# Patient Record
Sex: MALE | Race: WHITE | Employment: FULL TIME | URBAN - METROPOLITAN AREA
[De-identification: names, ages, dates, MRNs, and addresses within clinical notes are randomized per-mention and may not be internally consistent; named-entity substitution may affect disease eponyms.]

---

## 2017-05-27 PROCEDURE — 82043 UR ALBUMIN QUANTITATIVE: CPT | Performed by: INTERNAL MEDICINE

## 2017-05-27 PROCEDURE — 82570 ASSAY OF URINE CREATININE: CPT | Performed by: INTERNAL MEDICINE

## 2017-08-12 ENCOUNTER — APPOINTMENT (OUTPATIENT)
Dept: GENERAL RADIOLOGY | Facility: HOSPITAL | Age: 62
End: 2017-08-12
Attending: EMERGENCY MEDICINE
Payer: COMMERCIAL

## 2017-08-12 ENCOUNTER — HOSPITAL ENCOUNTER (EMERGENCY)
Facility: HOSPITAL | Age: 62
Discharge: HOME | End: 2017-08-12
Attending: EMERGENCY MEDICINE
Payer: COMMERCIAL

## 2017-08-12 VITALS
RESPIRATION RATE: 16 BRPM | TEMPERATURE: 98 F | SYSTOLIC BLOOD PRESSURE: 151 MMHG | DIASTOLIC BLOOD PRESSURE: 92 MMHG | HEART RATE: 85 BPM | BODY MASS INDEX: 26.6 KG/M2 | WEIGHT: 190 LBS | HEIGHT: 71 IN | OXYGEN SATURATION: 98 %

## 2017-08-12 DIAGNOSIS — R07.89 MUSCULAR CHEST PAIN: Primary | ICD-10-CM

## 2017-08-12 LAB
ALBUMIN SERPL-MCNC: 3.7 G/DL (ref 3.5–4.8)
ALP LIVER SERPL-CCNC: 130 U/L (ref 45–117)
ALT SERPL-CCNC: 46 U/L (ref 17–63)
AST SERPL-CCNC: 23 U/L (ref 15–41)
BASOPHILS # BLD AUTO: 0.03 X10(3) UL (ref 0–0.1)
BASOPHILS NFR BLD AUTO: 0.2 %
BILIRUB SERPL-MCNC: 0.2 MG/DL (ref 0.1–2)
BUN BLD-MCNC: 21 MG/DL (ref 8–20)
CALCIUM BLD-MCNC: 9.1 MG/DL (ref 8.3–10.3)
CHLORIDE: 105 MMOL/L (ref 101–111)
CO2: 23 MMOL/L (ref 22–32)
CREAT BLD-MCNC: 1.14 MG/DL (ref 0.7–1.3)
EOSINOPHIL # BLD AUTO: 0.1 X10(3) UL (ref 0–0.3)
EOSINOPHIL NFR BLD AUTO: 0.8 %
ERYTHROCYTE [DISTWIDTH] IN BLOOD BY AUTOMATED COUNT: 12.7 % (ref 11.5–16)
GLUCOSE BLD-MCNC: 264 MG/DL (ref 70–99)
HCT VFR BLD AUTO: 44.8 % (ref 37–53)
HGB BLD-MCNC: 15 G/DL (ref 13–17)
IMMATURE GRANULOCYTE COUNT: 0.04 X10(3) UL (ref 0–1)
IMMATURE GRANULOCYTE RATIO %: 0.3 %
LYMPHOCYTES # BLD AUTO: 1.23 X10(3) UL (ref 0.9–4)
LYMPHOCYTES NFR BLD AUTO: 9.5 %
M PROTEIN MFR SERPL ELPH: 7.6 G/DL (ref 6.1–8.3)
MCH RBC QN AUTO: 28.8 PG (ref 27–33.2)
MCHC RBC AUTO-ENTMCNC: 33.5 G/DL (ref 31–37)
MCV RBC AUTO: 86.2 FL (ref 80–99)
MONOCYTES # BLD AUTO: 0.97 X10(3) UL (ref 0.1–0.6)
MONOCYTES NFR BLD AUTO: 7.5 %
NEUTROPHIL ABS PRELIM: 10.53 X10 (3) UL (ref 1.3–6.7)
NEUTROPHILS # BLD AUTO: 10.53 X10(3) UL (ref 1.3–6.7)
NEUTROPHILS NFR BLD AUTO: 81.7 %
PLATELET # BLD AUTO: 239 10(3)UL (ref 150–450)
POTASSIUM SERPL-SCNC: 4 MMOL/L (ref 3.6–5.1)
RBC # BLD AUTO: 5.2 X10(6)UL (ref 4.3–5.7)
RED CELL DISTRIBUTION WIDTH-SD: 39.8 FL (ref 35.1–46.3)
SODIUM SERPL-SCNC: 137 MMOL/L (ref 136–144)
TROPONIN: <0.046 NG/ML (ref ?–0.05)
WBC # BLD AUTO: 12.9 X10(3) UL (ref 4–13)

## 2017-08-12 PROCEDURE — 93005 ELECTROCARDIOGRAM TRACING: CPT

## 2017-08-12 PROCEDURE — 99285 EMERGENCY DEPT VISIT HI MDM: CPT

## 2017-08-12 PROCEDURE — 80053 COMPREHEN METABOLIC PANEL: CPT | Performed by: EMERGENCY MEDICINE

## 2017-08-12 PROCEDURE — 85025 COMPLETE CBC W/AUTO DIFF WBC: CPT | Performed by: EMERGENCY MEDICINE

## 2017-08-12 PROCEDURE — 93010 ELECTROCARDIOGRAM REPORT: CPT

## 2017-08-12 PROCEDURE — 84484 ASSAY OF TROPONIN QUANT: CPT | Performed by: EMERGENCY MEDICINE

## 2017-08-12 PROCEDURE — 96374 THER/PROPH/DIAG INJ IV PUSH: CPT

## 2017-08-12 PROCEDURE — 71010 XR CHEST AP PORTABLE  (CPT=71010): CPT | Performed by: EMERGENCY MEDICINE

## 2017-08-12 RX ORDER — KETOROLAC TROMETHAMINE 30 MG/ML
30 INJECTION, SOLUTION INTRAMUSCULAR; INTRAVENOUS ONCE
Status: COMPLETED | OUTPATIENT
Start: 2017-08-12 | End: 2017-08-12

## 2017-08-12 RX ORDER — CYCLOBENZAPRINE HCL 10 MG
10 TABLET ORAL 3 TIMES DAILY PRN
Qty: 15 TABLET | Refills: 0 | Status: SHIPPED | OUTPATIENT
Start: 2017-08-12 | End: 2017-09-06 | Stop reason: ALTCHOICE

## 2017-08-12 NOTE — ED INITIAL ASSESSMENT (HPI)
Patient reports left arm/shoulder pain yesterday, relieved with OTC meds. Reports this morning around 730 am developed chest pain that feels like its radiating downwards from throat to midsternal area, aching and sometimes pressure.  Denies any shortness of

## 2017-08-12 NOTE — ED NOTES
Report to Richelle Champion RN at this time. Patient updated with results and plan of care. Ready for discharge at this time.

## 2017-08-12 NOTE — ED PROVIDER NOTES
Patient Seen in: [facility] Emergency Department    History   Patient presents with:  Chest Pain Angina (cardiovascular)    Stated Complaint: left arm pain/shoulder pain, chest pain    HPI    58-year-old male presents with chest pain.   Pain began 3 Does not apply route 2 (two) times daily. 4000 mg daily.         Family History   Problem Relation Age of Onset   • Diabetes Father    • Cancer Mother      breast   • Cancer Sister      melanoma   • Melanoma Sister    • Cancer Brother    • Diabetes Brother following:        Result Value    Glucose 264 (*)     BUN 21 (*)     Alkaline Phosphatase 130 (*)     All other components within normal limits   CBC W/ DIFFERENTIAL - Abnormal; Notable for the following:     Neutrophil Absolute Prelim 10.53 (*)     Neutro was initially in the left shoulder and he took Tylenol and then short while later developed pain down the middle of his chest from the neck to the epigastric region. This discomfort is reproducible with some movements. EKG unremarkable.     Workup negativ

## 2017-08-13 LAB
ATRIAL RATE: 87 BPM
P AXIS: 38 DEGREES
P-R INTERVAL: 160 MS
Q-T INTERVAL: 368 MS
QRS DURATION: 94 MS
QTC CALCULATION (BEZET): 442 MS
R AXIS: 2 DEGREES
T AXIS: 41 DEGREES
VENTRICULAR RATE: 87 BPM

## 2017-12-30 ENCOUNTER — HOSPITAL ENCOUNTER (EMERGENCY)
Facility: HOSPITAL | Age: 62
Discharge: HOME | End: 2017-12-30
Attending: STUDENT IN AN ORGANIZED HEALTH CARE EDUCATION/TRAINING PROGRAM
Payer: COMMERCIAL

## 2017-12-30 ENCOUNTER — OFFICE VISIT (OUTPATIENT)
Dept: FAMILY MEDICINE CLINIC | Facility: CLINIC | Age: 62
End: 2017-12-30

## 2017-12-30 VITALS
TEMPERATURE: 98 F | HEIGHT: 71 IN | RESPIRATION RATE: 18 BRPM | DIASTOLIC BLOOD PRESSURE: 70 MMHG | WEIGHT: 204 LBS | HEART RATE: 80 BPM | OXYGEN SATURATION: 100 % | SYSTOLIC BLOOD PRESSURE: 165 MMHG | BODY MASS INDEX: 28.56 KG/M2

## 2017-12-30 VITALS
WEIGHT: 204.38 LBS | RESPIRATION RATE: 20 BRPM | OXYGEN SATURATION: 98 % | HEART RATE: 79 BPM | DIASTOLIC BLOOD PRESSURE: 90 MMHG | BODY MASS INDEX: 28.61 KG/M2 | SYSTOLIC BLOOD PRESSURE: 148 MMHG | HEIGHT: 71 IN | TEMPERATURE: 98 F

## 2017-12-30 DIAGNOSIS — H60.502 ACUTE OTITIS EXTERNA OF LEFT EAR, UNSPECIFIED TYPE: ICD-10-CM

## 2017-12-30 DIAGNOSIS — H65.02 ACUTE SEROUS OTITIS MEDIA OF LEFT EAR, RECURRENCE NOT SPECIFIED: ICD-10-CM

## 2017-12-30 DIAGNOSIS — H92.02 EAR PAIN, LEFT: ICD-10-CM

## 2017-12-30 DIAGNOSIS — H61.23 BILATERAL IMPACTED CERUMEN: Primary | ICD-10-CM

## 2017-12-30 DIAGNOSIS — H60.502 ACUTE OTITIS EXTERNA OF LEFT EAR, UNSPECIFIED TYPE: Primary | ICD-10-CM

## 2017-12-30 PROCEDURE — 99283 EMERGENCY DEPT VISIT LOW MDM: CPT

## 2017-12-30 PROCEDURE — 99203 OFFICE O/P NEW LOW 30 MIN: CPT | Performed by: PHYSICIAN ASSISTANT

## 2017-12-30 RX ORDER — IBUPROFEN 600 MG/1
600 TABLET ORAL EVERY 8 HOURS PRN
Qty: 30 TABLET | Refills: 0 | Status: SHIPPED | OUTPATIENT
Start: 2017-12-30 | End: 2018-01-06

## 2017-12-30 RX ORDER — AMOXICILLIN 875 MG/1
875 TABLET, COATED ORAL 2 TIMES DAILY
Qty: 20 TABLET | Refills: 0 | Status: SHIPPED | OUTPATIENT
Start: 2017-12-30 | End: 2018-01-09

## 2017-12-30 RX ORDER — HYDROCODONE BITARTRATE AND ACETAMINOPHEN 5; 325 MG/1; MG/1
1-2 TABLET ORAL EVERY 4 HOURS PRN
Qty: 20 TABLET | Refills: 0 | Status: SHIPPED | OUTPATIENT
Start: 2017-12-30 | End: 2018-01-06

## 2017-12-30 RX ORDER — CIPROFLOXACIN AND DEXAMETHASONE 3; 1 MG/ML; MG/ML
4 SUSPENSION/ DROPS AURICULAR (OTIC) 2 TIMES DAILY
Qty: 7.5 ML | Refills: 0 | Status: SHIPPED | OUTPATIENT
Start: 2017-12-30 | End: 2018-01-06

## 2017-12-30 NOTE — PATIENT INSTRUCTIONS
1. Ciprodex  2. Canals cleared  3. Follow up with PCP    Earwax Removal/ Otitis externa    The ear canal makes earwax from the canal’s lining.  The ears make wax to lubricate and protect the ear canal. The ear canal is the tube that connects the middle ea · Don’t use cotton swabs in your ears. Cotton swabs may push wax deeper into the ear canal or damage the eardrum.  Use cotton gauze or a wet washcloth  to gently remove wax on the outside of the ear and around the opening to the ear canal.  · Don't use any © 0204-5069 [employer] [address] [facility], [employer] [place]. All rights reserved. This information is not intended as a substitute for professional medical care. Always follow your healthcare professional's instructions.         Externa · If you feel water trapped in your ear, use ear drops right away. You can get these drops over the counter at most drugstores.  They work by removing water from the ear canal.  Follow-up care  Follow up with your health care provider in one week, or as adv

## 2017-12-30 NOTE — PROGRESS NOTES
CHIEF COMPLAINT:   Patient presents with:  Ear Pain: left ear is block x3-4days      HPI:   Makayla Vogt is a 57year old male who presents to clinic today with complaints of left ear clogged for 4 days. The patient denies any pain.     Associated sym Alcohol use: No               Comment: rare       REVIEW OF SYSTEMS:   GENERAL: feels well  SKIN: no unusual skin lesions or rashes  HEENT: See HPI  LUNGS: No cough, shortness of breath, or wheezing.   CARDIOVASCULAR: No chest pain, palpitations benefits of medication discussed. Stressed importance of completing full course of antibiotic. Tylenol/Motrin prn pain. Call or return if s/sx worsen, do not improve in 3 days, or if fever of 100.4 or greater persists for 72 hours.     There are no

## 2017-12-31 NOTE — ED PROVIDER NOTES
Patient Seen in: [facility] Emergency Department    History   Patient presents with:  Ear Problem Pain (neurosensory)    Stated Complaint: left ear pain    HPI    Patient is a 64-year-old male presenting to the emergency department reporting gradual 180.3 cm (5' 11\")   Wt 92.5 kg   SpO2 100%   BMI 28.45 kg/m²         Physical Exam    Constitutional: oriented to person, place, and time, appears well-developed and well-nourished. HENT:   Head: Normocephalic and atraumatic.    Eyes: Pupils are equal, r (primary encounter diagnosis)  Acute serous otitis media of left ear, recurrence not specified  Ear pain, left    Disposition:  Discharge  12/30/2017 10:02 pm    Follow-up:  Franklyn Church MD  [address] [facility] [id number]  114

## 2017-12-31 NOTE — ED INITIAL ASSESSMENT (HPI)
Pt presents to ED with complaint of left ear pain x3 days. Pt reports seen at [facility] today and given abx ear drops. Pt reports worsening pain. Reports one dose of OTC tylenol without relief.  Denies fevers

## 2018-02-17 PROCEDURE — 82043 UR ALBUMIN QUANTITATIVE: CPT | Performed by: INTERNAL MEDICINE

## 2018-02-17 PROCEDURE — 82570 ASSAY OF URINE CREATININE: CPT | Performed by: INTERNAL MEDICINE

## 2018-03-04 ENCOUNTER — HOSPITAL ENCOUNTER (EMERGENCY)
Facility: HOSPITAL | Age: 63
Discharge: HOME | End: 2018-03-04
Attending: EMERGENCY MEDICINE
Payer: COMMERCIAL

## 2018-03-04 VITALS
OXYGEN SATURATION: 96 % | HEART RATE: 70 BPM | DIASTOLIC BLOOD PRESSURE: 86 MMHG | WEIGHT: 200 LBS | BODY MASS INDEX: 28 KG/M2 | HEIGHT: 71 IN | TEMPERATURE: 98 F | SYSTOLIC BLOOD PRESSURE: 141 MMHG | RESPIRATION RATE: 16 BRPM

## 2018-03-04 DIAGNOSIS — H43.812: ICD-10-CM

## 2018-03-04 DIAGNOSIS — H53.9 VISUAL DISTURBANCE: Primary | ICD-10-CM

## 2018-03-04 PROCEDURE — 99283 EMERGENCY DEPT VISIT LOW MDM: CPT

## 2018-03-04 RX ORDER — TETRACAINE HYDROCHLORIDE 5 MG/ML
2 SOLUTION OPHTHALMIC ONCE
Status: COMPLETED | OUTPATIENT
Start: 2018-03-04 | End: 2018-03-04

## 2018-03-05 NOTE — ED PROVIDER NOTES
Patient Seen in: [facility] Emergency Department    History   Patient presents with:   Eye Visual Problem (opthalmic)    Stated Complaint: eye problem    HPI    57-year-old male presents to the emergency department after having a visual disturbance i Right Eye Chart Acuity: 20/25, Corrected  Left Eye Chart Acuity: 20/50, Corrected    Physical Exam   Constitutional: He is oriented to person, place, and time. He appears well-developed and well-nourished. No distress.    HENT:   Head: Normocephalic and [address] [place] [id number] [phone]    Schedule an appointment as soon as possible for a visit          Medications Prescribed:  Current Discharge Medication List

## 2018-03-05 NOTE — ED INITIAL ASSESSMENT (HPI)
Patient arrives with c/o left eye visual disturbance that happened at 7 PM tonight. Patient states his vision in his left eye went blurry and then a large \"floater\" in the shaped of a \"W\" appeared for about 20 minutes.  Vision returned to normal after f

## 2018-07-21 PROCEDURE — 82570 ASSAY OF URINE CREATININE: CPT | Performed by: INTERNAL MEDICINE

## 2018-07-21 PROCEDURE — 82043 UR ALBUMIN QUANTITATIVE: CPT | Performed by: INTERNAL MEDICINE

## 2018-10-01 ENCOUNTER — APPOINTMENT (OUTPATIENT)
Dept: GENERAL RADIOLOGY | Age: 63
End: 2018-10-01
Attending: PHYSICIAN ASSISTANT
Payer: COMMERCIAL

## 2018-10-01 ENCOUNTER — HOSPITAL ENCOUNTER (OUTPATIENT)
Age: 63
Discharge: HOME | End: 2018-10-01
Payer: COMMERCIAL

## 2018-10-01 VITALS
DIASTOLIC BLOOD PRESSURE: 81 MMHG | HEIGHT: 71 IN | SYSTOLIC BLOOD PRESSURE: 154 MMHG | TEMPERATURE: 99 F | BODY MASS INDEX: 28 KG/M2 | OXYGEN SATURATION: 97 % | RESPIRATION RATE: 16 BRPM | HEART RATE: 58 BPM | WEIGHT: 200 LBS

## 2018-10-01 DIAGNOSIS — M54.31 SCIATICA OF RIGHT SIDE: ICD-10-CM

## 2018-10-01 DIAGNOSIS — G89.29 CHRONIC RIGHT SHOULDER PAIN: Primary | ICD-10-CM

## 2018-10-01 DIAGNOSIS — M25.511 CHRONIC RIGHT SHOULDER PAIN: Primary | ICD-10-CM

## 2018-10-01 PROCEDURE — 73030 X-RAY EXAM OF SHOULDER: CPT | Performed by: PHYSICIAN ASSISTANT

## 2018-10-01 PROCEDURE — 99214 OFFICE O/P EST MOD 30 MIN: CPT

## 2018-10-01 PROCEDURE — 96372 THER/PROPH/DIAG INJ SC/IM: CPT

## 2018-10-01 RX ORDER — CYCLOBENZAPRINE HCL 10 MG
10 TABLET ORAL NIGHTLY
Qty: 10 TABLET | Refills: 0 | Status: SHIPPED | OUTPATIENT
Start: 2018-10-01 | End: 2018-10-11

## 2018-10-01 RX ORDER — KETOROLAC TROMETHAMINE 30 MG/ML
30 INJECTION, SOLUTION INTRAMUSCULAR; INTRAVENOUS ONCE
Status: COMPLETED | OUTPATIENT
Start: 2018-10-01 | End: 2018-10-01

## 2018-10-01 RX ORDER — PREDNISONE 20 MG/1
40 TABLET ORAL DAILY
Qty: 8 TABLET | Refills: 0 | Status: SHIPPED | OUTPATIENT
Start: 2018-10-01 | End: 2018-10-05

## 2018-10-01 RX ORDER — DEXAMETHASONE SODIUM PHOSPHATE 4 MG/ML
10 VIAL (ML) INJECTION ONCE
Status: COMPLETED | OUTPATIENT
Start: 2018-10-01 | End: 2018-10-01

## 2018-10-01 RX ORDER — NAPROXEN 500 MG/1
500 TABLET ORAL 2 TIMES DAILY PRN
Qty: 20 TABLET | Refills: 0 | Status: SHIPPED | OUTPATIENT
Start: 2018-10-01 | End: 2018-10-08

## 2018-10-01 NOTE — ED PROVIDER NOTES
Patient Seen in: [address] [facility]    History   Patient presents with:  Shoulder Pain  Leg Pain    Stated Complaint: right side leg and arm pain x 4 weeks     HPI    26-year-old male here with complaint of right shoulder/arm pain in ta systems are as noted in HPI. Constitutional and vital signs reviewed. All other systems reviewed and negative except as noted above.     Physical Exam     ED Triage Vitals [10/01/18 1419]   BP (!) 166/80   Pulse 70   Resp 20   Temp 99 °F (37.2 °C)   T views were obtained. COMPARISON:  None.   INDICATIONS:  right side leg and arm pain x 4 weeks  PATIENT STATED HISTORY: (As transcribed by Technologist)  Patient presents with chronic right shoulder pain for years, that has been worse in the last few months as needed. Qty: 20 tablet Refills: 0    predniSONE 20 MG Oral Tab  Take 2 tablets (40 mg total) by mouth daily for 4 days. Qty: 8 tablet Refills: 0    Cyclobenzaprine HCl 10 MG Oral Tab  Take 1 tablet (10 mg total) by mouth nightly for 10 days.   Qty: 10

## 2018-10-01 NOTE — ED INITIAL ASSESSMENT (HPI)
Complains of right shoulder pain on and off for the last five months but the last month, pain is worse. Also having on and off right leg pain with certain movements. When pain comes on, hard to walk.

## 2018-11-17 ENCOUNTER — APPOINTMENT (OUTPATIENT)
Dept: CT IMAGING | Facility: HOSPITAL | Age: 63
End: 2018-11-17
Attending: EMERGENCY MEDICINE
Payer: COMMERCIAL

## 2018-11-17 ENCOUNTER — HOSPITAL ENCOUNTER (EMERGENCY)
Facility: HOSPITAL | Age: 63
Discharge: HOME | End: 2018-11-17
Attending: EMERGENCY MEDICINE
Payer: COMMERCIAL

## 2018-11-17 VITALS
WEIGHT: 200 LBS | RESPIRATION RATE: 16 BRPM | DIASTOLIC BLOOD PRESSURE: 72 MMHG | SYSTOLIC BLOOD PRESSURE: 135 MMHG | TEMPERATURE: 97 F | OXYGEN SATURATION: 97 % | HEIGHT: 71 IN | HEART RATE: 51 BPM | BODY MASS INDEX: 28 KG/M2

## 2018-11-17 DIAGNOSIS — N20.0 RENAL STONE: Primary | ICD-10-CM

## 2018-11-17 PROCEDURE — 96375 TX/PRO/DX INJ NEW DRUG ADDON: CPT

## 2018-11-17 PROCEDURE — 83690 ASSAY OF LIPASE: CPT | Performed by: EMERGENCY MEDICINE

## 2018-11-17 PROCEDURE — 81001 URINALYSIS AUTO W/SCOPE: CPT | Performed by: EMERGENCY MEDICINE

## 2018-11-17 PROCEDURE — 99284 EMERGENCY DEPT VISIT MOD MDM: CPT

## 2018-11-17 PROCEDURE — 74176 CT ABD & PELVIS W/O CONTRAST: CPT | Performed by: EMERGENCY MEDICINE

## 2018-11-17 PROCEDURE — 96374 THER/PROPH/DIAG INJ IV PUSH: CPT

## 2018-11-17 PROCEDURE — 85025 COMPLETE CBC W/AUTO DIFF WBC: CPT | Performed by: EMERGENCY MEDICINE

## 2018-11-17 PROCEDURE — 80053 COMPREHEN METABOLIC PANEL: CPT | Performed by: EMERGENCY MEDICINE

## 2018-11-17 RX ORDER — MORPHINE SULFATE 4 MG/ML
4 INJECTION, SOLUTION INTRAMUSCULAR; INTRAVENOUS EVERY 30 MIN PRN
Status: DISCONTINUED | OUTPATIENT
Start: 2018-11-17 | End: 2018-11-17

## 2018-11-17 RX ORDER — ONDANSETRON 2 MG/ML
4 INJECTION INTRAMUSCULAR; INTRAVENOUS ONCE
Status: COMPLETED | OUTPATIENT
Start: 2018-11-17 | End: 2018-11-17

## 2018-11-17 RX ORDER — IBUPROFEN 600 MG/1
600 TABLET ORAL EVERY 8 HOURS PRN
Qty: 30 TABLET | Refills: 0 | Status: SHIPPED | OUTPATIENT
Start: 2018-11-17 | End: 2018-12-17

## 2018-11-17 RX ORDER — KETOROLAC TROMETHAMINE 30 MG/ML
30 INJECTION, SOLUTION INTRAMUSCULAR; INTRAVENOUS ONCE
Status: COMPLETED | OUTPATIENT
Start: 2018-11-17 | End: 2018-11-17

## 2018-11-17 RX ORDER — HYDROCODONE BITARTRATE AND ACETAMINOPHEN 5; 325 MG/1; MG/1
1-2 TABLET ORAL EVERY 6 HOURS PRN
Qty: 20 TABLET | Refills: 0 | Status: SHIPPED | OUTPATIENT
Start: 2018-11-17 | End: 2018-11-24

## 2018-11-17 NOTE — ED NOTES
Patient is resting comfortably - awakens easily and denies pain/nausea. Awaiting urine results. Pt is aware he requires a ride home after morphine.

## 2018-11-17 NOTE — ED NOTES
russell approved of pt leaving at 0530/see emar. Pt remains resting comfortably. Awaiting dischg at 0530.

## 2018-11-17 NOTE — ED NOTES
Pt with steady gait to the bathroom; urine spec sent.  Pt reports decrease in left flank pain and denies n/v.

## 2018-11-17 NOTE — ED PROVIDER NOTES
Patient Seen in: [facility] Emergency Department    History   Patient presents with:  Back Pain (musculoskeletal)  Abdomen/Flank Pain (GI/)    Stated Complaint:     HPI    The patient is a 18-year-old male with a history of renal stones, that have deficits. No facial droop or slurred speech. CN II-XII intact. Grossly normal and symmetric motor strength and sensation proximally and distally throughout all 4 extremities. HEENT: No lymphadenopathy. Oropharynx nml.  Mucus membranes moist.   Supple neck individual orders. RAINBOW DRAW BLUE   RAINBOW DRAW LAVENDER   RAINBOW DRAW LIGHT GREEN   RAINBOW DRAW GOLD   CBC W/ DIFFERENTIAL          Blood was obtained and peripheral IV access was established. He was given IV morphine, normal saline and Zofran. Medication List    START taking these medications    ibuprofen 600 MG Oral Tab  Take 1 tablet (600 mg total) by mouth every 8 (eight) hours as needed for Pain.   Qty: 30 tablet Refills: 0    HYDROcodone-acetaminophen 5-325 MG Oral Tab  Take 1-2 tablets by m

## 2018-11-26 ENCOUNTER — HOSPITAL ENCOUNTER (EMERGENCY)
Facility: HOSPITAL | Age: 63
Discharge: HOME | End: 2018-11-26
Attending: EMERGENCY MEDICINE
Payer: COMMERCIAL

## 2018-11-26 ENCOUNTER — APPOINTMENT (OUTPATIENT)
Dept: CT IMAGING | Facility: HOSPITAL | Age: 63
End: 2018-11-26
Attending: EMERGENCY MEDICINE
Payer: COMMERCIAL

## 2018-11-26 ENCOUNTER — APPOINTMENT (OUTPATIENT)
Dept: GENERAL RADIOLOGY | Facility: HOSPITAL | Age: 63
End: 2018-11-26
Attending: EMERGENCY MEDICINE
Payer: COMMERCIAL

## 2018-11-26 VITALS
HEART RATE: 55 BPM | RESPIRATION RATE: 16 BRPM | SYSTOLIC BLOOD PRESSURE: 138 MMHG | BODY MASS INDEX: 28 KG/M2 | WEIGHT: 200 LBS | DIASTOLIC BLOOD PRESSURE: 74 MMHG | OXYGEN SATURATION: 96 % | TEMPERATURE: 98 F | HEIGHT: 71 IN

## 2018-11-26 DIAGNOSIS — N20.0 KIDNEY STONE: Primary | ICD-10-CM

## 2018-11-26 PROCEDURE — 81001 URINALYSIS AUTO W/SCOPE: CPT | Performed by: EMERGENCY MEDICINE

## 2018-11-26 PROCEDURE — 99284 EMERGENCY DEPT VISIT MOD MDM: CPT | Performed by: EMERGENCY MEDICINE

## 2018-11-26 PROCEDURE — 85025 COMPLETE CBC W/AUTO DIFF WBC: CPT | Performed by: EMERGENCY MEDICINE

## 2018-11-26 PROCEDURE — 74176 CT ABD & PELVIS W/O CONTRAST: CPT | Performed by: EMERGENCY MEDICINE

## 2018-11-26 PROCEDURE — 96375 TX/PRO/DX INJ NEW DRUG ADDON: CPT | Performed by: EMERGENCY MEDICINE

## 2018-11-26 PROCEDURE — 74018 RADEX ABDOMEN 1 VIEW: CPT | Performed by: EMERGENCY MEDICINE

## 2018-11-26 PROCEDURE — 96361 HYDRATE IV INFUSION ADD-ON: CPT | Performed by: EMERGENCY MEDICINE

## 2018-11-26 PROCEDURE — 80053 COMPREHEN METABOLIC PANEL: CPT | Performed by: EMERGENCY MEDICINE

## 2018-11-26 PROCEDURE — 96374 THER/PROPH/DIAG INJ IV PUSH: CPT | Performed by: EMERGENCY MEDICINE

## 2018-11-26 RX ORDER — KETOROLAC TROMETHAMINE 30 MG/ML
30 INJECTION, SOLUTION INTRAMUSCULAR; INTRAVENOUS ONCE
Status: COMPLETED | OUTPATIENT
Start: 2018-11-26 | End: 2018-11-26

## 2018-11-26 RX ORDER — MORPHINE SULFATE 4 MG/ML
4 INJECTION, SOLUTION INTRAMUSCULAR; INTRAVENOUS EVERY 30 MIN PRN
Status: DISCONTINUED | OUTPATIENT
Start: 2018-11-26 | End: 2018-11-26

## 2018-11-26 RX ORDER — ONDANSETRON 2 MG/ML
4 INJECTION INTRAMUSCULAR; INTRAVENOUS ONCE
Status: COMPLETED | OUTPATIENT
Start: 2018-11-26 | End: 2018-11-26

## 2018-11-26 NOTE — ED PROVIDER NOTES
Patient Seen in: [facility] Emergency Department    History   Patient presents with:  Abdomen/Flank Pain (GI/)  Back Pain (musculoskeletal)    Stated Complaint: Flank/back/abdominal pain, was seen last week for 3mm kidney stone L side    HPI    65 (36.8 °C)   Temp src Temporal   SpO2 95 %   O2 Device None (Room air)       Current:/74   Pulse 55   Temp 98.2 °F (36.8 °C) (Temporal)   Resp 16   Ht 180.3 cm (5' 11\")   Wt 90.7 kg   SpO2 96%   BMI 27.89 kg/m²         Physical Exam   Constitutional: WITH PLATELET.   Procedure                               Abnormality         Status                     ---------                               -----------         ------                     CBC W/ DIFFERENTIAL[[id number]]          Abnormal            Final in this area including structure/UPJ obstruction. 2. Cholelithiasis. Dictated by: Santos Bryson MD on 11/17/2018 at 8:13     Approved by: Santos Bryson MD              MDM   Patient had an IV established and blood work obtained.   He was given fl

## 2018-11-26 NOTE — ED NOTES
States no change in pain after receiving Toradol. Continues to be 7-8/10. Administered morphine. Will continue to monitor.

## 2018-11-26 NOTE — ED INITIAL ASSESSMENT (HPI)
Pt was in the ER last week with difficulty passing a kidney stones. Presents this morning with increased pain in his left flank. Denies dysuria or other urinary s/s.

## 2018-12-03 PROCEDURE — 82365 CALCULUS SPECTROSCOPY: CPT | Performed by: UROLOGY

## 2019-01-03 ENCOUNTER — HOSPITAL ENCOUNTER (OUTPATIENT)
Age: 64
Discharge: HOME | End: 2019-01-03
Payer: COMMERCIAL

## 2019-01-03 VITALS
TEMPERATURE: 98 F | DIASTOLIC BLOOD PRESSURE: 76 MMHG | HEIGHT: 71 IN | SYSTOLIC BLOOD PRESSURE: 151 MMHG | WEIGHT: 187 LBS | RESPIRATION RATE: 20 BRPM | OXYGEN SATURATION: 97 % | BODY MASS INDEX: 26.18 KG/M2 | HEART RATE: 89 BPM

## 2019-01-03 DIAGNOSIS — J01.00 ACUTE NON-RECURRENT MAXILLARY SINUSITIS: Primary | ICD-10-CM

## 2019-01-03 PROCEDURE — 99214 OFFICE O/P EST MOD 30 MIN: CPT

## 2019-01-03 PROCEDURE — 99213 OFFICE O/P EST LOW 20 MIN: CPT

## 2019-01-03 RX ORDER — AMOXICILLIN AND CLAVULANATE POTASSIUM 875; 125 MG/1; MG/1
1 TABLET, FILM COATED ORAL 2 TIMES DAILY
Qty: 20 TABLET | Refills: 0 | Status: SHIPPED | OUTPATIENT
Start: 2019-01-03 | End: 2019-01-11

## 2019-01-03 RX ORDER — FLUTICASONE PROPIONATE 50 MCG
2 SPRAY, SUSPENSION (ML) NASAL DAILY
Qty: 16 G | Refills: 0 | Status: SHIPPED | OUTPATIENT
Start: 2019-01-03 | End: 2019-01-11

## 2019-01-03 NOTE — ED PROVIDER NOTES
Patient Seen in: [address] [facility]    History   Patient presents with:  Sinus Problem    Stated Complaint: ears plugged    HPI    68-year-old male who comes in today with a past medical history of type 2 diabetes and hypertension compl appropriate for age  Head:  Normocephalic, atraumatic, without obvious abnormality  Eyes:  PERRL, EOM's intact, conjunctiva and cornea clear, normal fundoscopic exam   Ears:  TM pearly gray color, mild bilateral effusion, external ear canals normal, both e medications    Amoxicillin-Pot Clavulanate 875-125 MG Oral Tab  Take 1 tablet by mouth 2 (two) times daily for 10 days. Qty: 20 tablet Refills: 0    Fluticasone Propionate 50 MCG/ACT Nasal Suspension  2 sprays by Nasal route daily.   Qty: 16 g Refills: 0

## 2019-05-23 ENCOUNTER — HOSPITAL ENCOUNTER (OUTPATIENT)
Age: 64
Discharge: HOME | End: 2019-05-23
Attending: FAMILY MEDICINE
Payer: COMMERCIAL

## 2019-05-23 ENCOUNTER — APPOINTMENT (OUTPATIENT)
Dept: CT IMAGING | Age: 64
End: 2019-05-23
Attending: FAMILY MEDICINE
Payer: COMMERCIAL

## 2019-05-23 VITALS
RESPIRATION RATE: 20 BRPM | OXYGEN SATURATION: 98 % | TEMPERATURE: 98 F | HEART RATE: 84 BPM | HEIGHT: 71 IN | DIASTOLIC BLOOD PRESSURE: 82 MMHG | BODY MASS INDEX: 26.04 KG/M2 | SYSTOLIC BLOOD PRESSURE: 166 MMHG | WEIGHT: 186 LBS

## 2019-05-23 DIAGNOSIS — K57.92 ACUTE DIVERTICULITIS: Primary | ICD-10-CM

## 2019-05-23 DIAGNOSIS — R63.4 WEIGHT LOSS: ICD-10-CM

## 2019-05-23 DIAGNOSIS — R10.30 LOWER ABDOMINAL PAIN: ICD-10-CM

## 2019-05-23 DIAGNOSIS — R61 NIGHT SWEATS: ICD-10-CM

## 2019-05-23 PROCEDURE — 96360 HYDRATION IV INFUSION INIT: CPT

## 2019-05-23 PROCEDURE — 99215 OFFICE O/P EST HI 40 MIN: CPT

## 2019-05-23 PROCEDURE — 99214 OFFICE O/P EST MOD 30 MIN: CPT

## 2019-05-23 PROCEDURE — 74177 CT ABD & PELVIS W/CONTRAST: CPT | Performed by: FAMILY MEDICINE

## 2019-05-23 PROCEDURE — 81002 URINALYSIS NONAUTO W/O SCOPE: CPT | Performed by: FAMILY MEDICINE

## 2019-05-23 PROCEDURE — 85025 COMPLETE CBC W/AUTO DIFF WBC: CPT | Performed by: FAMILY MEDICINE

## 2019-05-23 PROCEDURE — 87086 URINE CULTURE/COLONY COUNT: CPT | Performed by: FAMILY MEDICINE

## 2019-05-23 PROCEDURE — 80047 BASIC METABLC PNL IONIZED CA: CPT

## 2019-05-23 RX ORDER — DIPHENHYDRAMINE HYDROCHLORIDE 12.5 MG/5ML
25 SOLUTION ORAL ONCE
Status: COMPLETED | OUTPATIENT
Start: 2019-05-23 | End: 2019-05-23

## 2019-05-23 RX ORDER — SODIUM CHLORIDE 9 MG/ML
1000 INJECTION, SOLUTION INTRAVENOUS ONCE
Status: COMPLETED | OUTPATIENT
Start: 2019-05-23 | End: 2019-05-23

## 2019-05-23 RX ORDER — METRONIDAZOLE 500 MG/1
500 TABLET ORAL 2 TIMES DAILY
Qty: 30 TABLET | Refills: 0 | Status: SHIPPED | OUTPATIENT
Start: 2019-05-23 | End: 2019-07-16 | Stop reason: ALTCHOICE

## 2019-05-23 RX ORDER — CIPROFLOXACIN 500 MG/1
500 TABLET, FILM COATED ORAL 2 TIMES DAILY
Qty: 20 TABLET | Refills: 0 | Status: SHIPPED | OUTPATIENT
Start: 2019-05-23 | End: 2019-07-16 | Stop reason: ALTCHOICE

## 2019-05-23 NOTE — ED NOTES
Ct techNicolasa in to explain oral contrast to patient. Patient drank first bottle of contrast without difficulty. No nausea or vomiting.

## 2019-05-23 NOTE — ED NOTES
Pt returned from CT with one hive to right chest.  Benadryl po given as ordered. Pt denies SOB, scratchy throat, or difficulty swallowing. No facial/tongue swelling noted.

## 2019-05-23 NOTE — ED PROVIDER NOTES
Patient Seen in: [facility]    History   Patient presents with:  Abdominal Pain  Nausea    Stated Complaint: urinary issue / no appetite / nausea / rapid weight loss / low abdominal pain x*    HPI    This 25-year-old male presen mellitus without mention of complication, not stated as uncontrolled     diagnosed last year march not on any medication    • Unspecified essential hypertension        Past Surgical History:   Procedure Laterality Date   • COLONOSCOPY         Family histor components:       Result Value    WBC IC 15.5 (*)     # Neutrophil 13.0 (*)     # Mixed Cells 1.4 (*)     All other components within normal limits   POCT URINALYSIS DIPSTICK - Abnormal; Notable for the following components:    Urine Clarity Slightly cloud symptoms-otherwise he should keep his appointment as scheduled. He is instructed to go to the emergency room for worsening abdominal pain, blood in the stools, persistent vomiting any other worsening symptoms.       Disposition and Plan     Clinical Impres

## 2019-05-23 NOTE — ED INITIAL ASSESSMENT (HPI)
Pt c/o lower abdominal pain, weight loss, nausea and decreased appetite for 2 1/2 weeks. States saw doctor last month for similar symptoms. States symptoms resolved after stopping probiotics but then started again.   Had CT ordered by PCP but never had com

## 2019-05-23 NOTE — IMAGING NOTE
After returning patient to Room 6 inside [facility], patient was beginning to feel itchiness in right subclavicular area. I notified Dr. Marion Okeefe, she was inside the room with the patient when I left him.

## 2019-05-26 NOTE — ED NOTES
Patient called and was notified of urine culture results. Patient instructed to follow up with his PCP as directed and verbalizes understanding.

## 2019-05-27 ENCOUNTER — HOSPITAL ENCOUNTER (OUTPATIENT)
Age: 64
Discharge: HOME | End: 2019-05-27
Attending: FAMILY MEDICINE
Payer: COMMERCIAL

## 2019-05-27 VITALS
BODY MASS INDEX: 26 KG/M2 | WEIGHT: 187 LBS | HEART RATE: 70 BPM | RESPIRATION RATE: 18 BRPM | SYSTOLIC BLOOD PRESSURE: 177 MMHG | TEMPERATURE: 98 F | DIASTOLIC BLOOD PRESSURE: 77 MMHG | OXYGEN SATURATION: 97 %

## 2019-05-27 DIAGNOSIS — R11.0 NAUSEA: Primary | ICD-10-CM

## 2019-05-27 PROCEDURE — 99213 OFFICE O/P EST LOW 20 MIN: CPT

## 2019-05-27 PROCEDURE — 99214 OFFICE O/P EST MOD 30 MIN: CPT

## 2019-05-27 RX ORDER — ONDANSETRON 4 MG/1
4 TABLET, ORALLY DISINTEGRATING ORAL ONCE
Status: COMPLETED | OUTPATIENT
Start: 2019-05-27 | End: 2019-05-27

## 2019-05-27 RX ORDER — ONDANSETRON 4 MG/1
4 TABLET, ORALLY DISINTEGRATING ORAL EVERY 4 HOURS PRN
Qty: 10 TABLET | Refills: 0 | Status: SHIPPED | OUTPATIENT
Start: 2019-05-27 | End: 2019-06-03

## 2019-05-27 NOTE — ED INITIAL ASSESSMENT (HPI)
Pt. States he has had severe nausea for a few days due to probiotics he thinks. States it has happened before. States he did not take it this morning & can already feel the difference (better).  Has been taking culturelle for the antibiotics for diverticuli

## 2019-05-28 NOTE — ED PROVIDER NOTES
Patient Seen in: [facility]    History   Patient presents with:  Nausea    Stated Complaint: SEVERE NAUSEA    HPI    70year old male presents for nausea. States he has had nausea for past few weeks.  States he started taking pro Right Ear: Hearing, tympanic membrane, external ear and ear canal normal.   Left Ear: Hearing, tympanic membrane, external ear and ear canal normal.   Nose: Nose normal.   Mouth/Throat: Uvula is midline, oropharynx is clear and moist and mucous membranes

## 2019-07-09 ENCOUNTER — APPOINTMENT (OUTPATIENT)
Dept: GENERAL RADIOLOGY | Facility: HOSPITAL | Age: 64
End: 2019-07-09
Attending: EMERGENCY MEDICINE
Payer: COMMERCIAL

## 2019-07-09 ENCOUNTER — HOSPITAL ENCOUNTER (EMERGENCY)
Facility: HOSPITAL | Age: 64
Discharge: HOME | End: 2019-07-09
Attending: EMERGENCY MEDICINE
Payer: COMMERCIAL

## 2019-07-09 VITALS
HEART RATE: 91 BPM | BODY MASS INDEX: 28 KG/M2 | SYSTOLIC BLOOD PRESSURE: 163 MMHG | OXYGEN SATURATION: 99 % | WEIGHT: 200 LBS | RESPIRATION RATE: 14 BRPM | DIASTOLIC BLOOD PRESSURE: 88 MMHG | TEMPERATURE: 98 F

## 2019-07-09 DIAGNOSIS — S16.1XXA STRAIN OF NECK MUSCLE, INITIAL ENCOUNTER: Primary | ICD-10-CM

## 2019-07-09 DIAGNOSIS — S49.92XA INJURY OF LEFT SHOULDER, INITIAL ENCOUNTER: ICD-10-CM

## 2019-07-09 PROCEDURE — 99284 EMERGENCY DEPT VISIT MOD MDM: CPT

## 2019-07-09 PROCEDURE — 72050 X-RAY EXAM NECK SPINE 4/5VWS: CPT | Performed by: EMERGENCY MEDICINE

## 2019-07-09 PROCEDURE — 73030 X-RAY EXAM OF SHOULDER: CPT | Performed by: EMERGENCY MEDICINE

## 2019-07-09 PROCEDURE — 71046 X-RAY EXAM CHEST 2 VIEWS: CPT | Performed by: EMERGENCY MEDICINE
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
## 2019-08-01 PROBLEM — M25.512 ACUTE PAIN OF LEFT SHOULDER: Status: ACTIVE | Noted: 2019-08-01

## 2019-08-17 ENCOUNTER — HOSPITAL ENCOUNTER (OUTPATIENT)
Age: 64
Discharge: HOME | End: 2019-08-17
Attending: FAMILY MEDICINE
Payer: COMMERCIAL

## 2019-08-17 VITALS
HEART RATE: 70 BPM | RESPIRATION RATE: 16 BRPM | OXYGEN SATURATION: 96 % | SYSTOLIC BLOOD PRESSURE: 142 MMHG | DIASTOLIC BLOOD PRESSURE: 79 MMHG | TEMPERATURE: 99 F

## 2019-08-17 DIAGNOSIS — K57.92 ACUTE DIVERTICULITIS: Primary | ICD-10-CM

## 2019-08-17 LAB
#MXD IC: 1.1 X10ˆ3/UL (ref 0.1–1)
CREAT BLD-MCNC: 0.9 MG/DL (ref 0.7–1.3)
GLUCOSE BLD-MCNC: 147 MG/DL (ref 70–99)
HCT VFR BLD AUTO: 39.4 % (ref 39–53)
HGB BLD-MCNC: 14 G/DL (ref 13–17.5)
ISTAT BUN: 21 MG/DL (ref 8–20)
ISTAT CHLORIDE: 105 MMOL/L (ref 101–111)
ISTAT HEMATOCRIT: 40 % (ref 37–53)
ISTAT IONIZED CALCIUM FOR CHEM 8: 1.21 MMOL/L (ref 1.12–1.32)
ISTAT POTASSIUM: 4 MMOL/L (ref 3.6–5.1)
ISTAT SODIUM: 139 MMOL/L (ref 136–145)
LYMPHOCYTES # BLD AUTO: 1 X10ˆ3/UL (ref 1–4)
LYMPHOCYTES NFR BLD AUTO: 8.8 %
MCH RBC QN AUTO: 30.5 PG (ref 26–34)
MCHC RBC AUTO-ENTMCNC: 35.5 G/DL (ref 31–37)
MCV RBC AUTO: 85.8 FL (ref 80–100)
MIXED CELL %: 9.3 %
NEUTROPHILS # BLD AUTO: 9.2 X10ˆ3/UL (ref 1.5–7.7)
NEUTROPHILS NFR BLD AUTO: 81.9 %
PLATELET # BLD AUTO: 186 X10ˆ3/UL (ref 150–450)
POCT BILIRUBIN URINE: NEGATIVE
POCT GLUCOSE URINE: NEGATIVE MG/DL
POCT KETONE URINE: NEGATIVE MG/DL
POCT LEUKOCYTE ESTERASE URINE: NEGATIVE
POCT NITRITE URINE: NEGATIVE
POCT PH URINE: 6 (ref 5–8)
POCT PROTEIN URINE: NEGATIVE MG/DL
POCT SPECIFIC GRAVITY URINE: 1.02
POCT URINE CLARITY: CLEAR
POCT URINE COLOR: YELLOW
POCT UROBILINOGEN URINE: 0.2 MG/DL
RBC # BLD AUTO: 4.59 X10ˆ6/UL (ref 3.8–5.8)
WBC # BLD AUTO: 11.3 X10ˆ3/UL (ref 4–11)

## 2019-08-17 PROCEDURE — 80047 BASIC METABLC PNL IONIZED CA: CPT

## 2019-08-17 PROCEDURE — 85025 COMPLETE CBC W/AUTO DIFF WBC: CPT | Performed by: FAMILY MEDICINE

## 2019-08-17 PROCEDURE — 99214 OFFICE O/P EST MOD 30 MIN: CPT

## 2019-08-17 PROCEDURE — 36415 COLL VENOUS BLD VENIPUNCTURE: CPT

## 2019-08-17 PROCEDURE — 99213 OFFICE O/P EST LOW 20 MIN: CPT

## 2019-08-17 PROCEDURE — 81002 URINALYSIS NONAUTO W/O SCOPE: CPT | Performed by: FAMILY MEDICINE

## 2019-08-17 RX ORDER — AMOXICILLIN AND CLAVULANATE POTASSIUM 875; 125 MG/1; MG/1
875 TABLET, FILM COATED ORAL 2 TIMES DAILY
Qty: 20 TABLET | Refills: 0 | Status: SHIPPED | OUTPATIENT
Start: 2019-08-17 | End: 2019-09-12

## 2019-08-17 NOTE — ED INITIAL ASSESSMENT (HPI)
C/o abdominal pain characterized as more weights/heaviness tarted yesterday, chills/shaking on Thursday. Losing appetite and on and off nausea. 2 months ago treated with Diverticulitis. Denies urinary symptoms.

## 2019-08-17 NOTE — ED PROVIDER NOTES
Patient Seen in: [facility]    History   Patient presents with:  Abdominal Pain    Stated Complaint: DIVERTICULITIS X 3 DAYS    HPI    This 75-year-old male who has diabetes, HTN and a known history for diverticulitis presents t Temp 99.2 °F (37.3 °C)   Temp src Temporal   SpO2 96 %   O2 Device None (Room air)       Current:/79   Pulse 70   Temp 99.2 °F (37.3 °C) (Temporal)   Resp 16   SpO2 96%         Physical Exam    General: WH/WN/WD, in NAD, A and O times 3  HEAD: Norm pain, blood in the stools. He is to follow-up with his primary doctor in 3 days if not improving on the Augmentin.               Disposition and Plan     Clinical Impression:  Acute diverticulitis  (primary encounter diagnosis)    Disposition:  Discharge

## 2019-10-04 PROBLEM — K57.92 ACUTE DIVERTICULITIS: Status: ACTIVE | Noted: 2019-10-04

## 2019-10-04 PROBLEM — R12 HEARTBURN: Status: ACTIVE | Noted: 2019-10-04

## 2019-10-04 PROBLEM — R10.33 PERIUMBILICAL ABDOMINAL PAIN: Status: ACTIVE | Noted: 2019-10-04

## 2019-10-15 ENCOUNTER — OFFICE VISIT (OUTPATIENT)
Dept: FAMILY MEDICINE CLINIC | Facility: CLINIC | Age: 64
End: 2019-10-15
Payer: COMMERCIAL

## 2019-10-15 VITALS
HEIGHT: 71 IN | SYSTOLIC BLOOD PRESSURE: 140 MMHG | RESPIRATION RATE: 16 BRPM | BODY MASS INDEX: 25.9 KG/M2 | WEIGHT: 185 LBS | OXYGEN SATURATION: 97 % | TEMPERATURE: 99 F | HEART RATE: 68 BPM | DIASTOLIC BLOOD PRESSURE: 88 MMHG

## 2019-10-15 DIAGNOSIS — H65.192 ACUTE NON-SUPPURATIVE OTITIS MEDIA, LEFT: ICD-10-CM

## 2019-10-15 DIAGNOSIS — H61.22 IMPACTED CERUMEN OF LEFT EAR: Primary | ICD-10-CM

## 2019-10-15 RX ORDER — AZITHROMYCIN 250 MG/1
TABLET, FILM COATED ORAL
Qty: 6 TABLET | Refills: 0 | Status: SHIPPED | OUTPATIENT
Start: 2019-10-15 | End: 2019-11-18

## 2019-10-15 NOTE — PROGRESS NOTES
Austin Kim is a 75year old male. CHIEF COMPLAINT:   Patient presents with:  Ear Pain: left ear       HPI:   The patient complains of  2 day history of left ear pain. Reports left ear congestion and reduced hearing in affected ear(s). Denies fever.  Paulette Ontiveros • Calculus of kidney    • [facility]    • Disorder of prostate    • Fatigue    • Fever    • Flatulence/gas pain/belching    • Hearing loss    • Heartburn    • Hemorrhoids    • High cholesterol    • Indigestion    • Kidney stones    • Loss of a THROAT: oral mucosa pink, moist. Posterior pharynx is not erythematous or injected. No exudates  NECK: supple, non-tender  LUNGS: clear to auscultation bilaterally, no wheezes or rhonchi.  Breathing is non labored  CARDIO: RRR without murmur  EXTREMITIES: n Follow up with your healthcare provider, or as advised, in 2 weeks if all symptoms have not gotten better, or if hearing doesn't go back to normal within 1 month.   When to seek medical advice  Call your healthcare provider right away if any of these occur:

## 2019-10-15 NOTE — PATIENT INSTRUCTIONS
Middle Ear Infection (Adult)  You have an infection of the middle ear, the space behind the eardrum. This is also called acute otitis media (AOM). Sometimes it is caused by the common cold.  This is because congestion can block the internal passage (eustach

## 2019-11-11 PROBLEM — K29.30 CHRONIC SUPERFICIAL GASTRITIS: Status: ACTIVE | Noted: 2019-11-11

## 2019-11-11 PROBLEM — R14.1 GAS PAIN: Status: ACTIVE | Noted: 2019-11-11

## 2019-11-11 PROBLEM — K21.9 GASTROESOPHAGEAL REFLUX DISEASE WITHOUT ESOPHAGITIS: Status: ACTIVE | Noted: 2019-11-11

## 2019-11-11 PROBLEM — R10.84 ABDOMINAL PAIN, GENERALIZED: Status: ACTIVE | Noted: 2019-11-11

## 2019-11-11 PROBLEM — Z86.010 PERSONAL HISTORY OF COLONIC POLYPS: Status: ACTIVE | Noted: 2019-11-11

## 2019-11-11 PROBLEM — R68.81 EARLY SATIETY: Status: ACTIVE | Noted: 2019-11-11

## 2020-06-30 ENCOUNTER — HOSPITAL ENCOUNTER (OUTPATIENT)
Age: 65
Discharge: HOME | End: 2020-06-30
Payer: COMMERCIAL

## 2020-06-30 VITALS
HEART RATE: 74 BPM | TEMPERATURE: 98 F | DIASTOLIC BLOOD PRESSURE: 80 MMHG | RESPIRATION RATE: 16 BRPM | OXYGEN SATURATION: 97 % | WEIGHT: 189 LBS | BODY MASS INDEX: 26.46 KG/M2 | SYSTOLIC BLOOD PRESSURE: 149 MMHG | HEIGHT: 71 IN

## 2020-06-30 DIAGNOSIS — H61.23 BILATERAL IMPACTED CERUMEN: Primary | ICD-10-CM

## 2020-06-30 PROCEDURE — 99212 OFFICE O/P EST SF 10 MIN: CPT

## 2020-06-30 NOTE — ED PROVIDER NOTES
Patient Seen in: [facility]      History   Patient presents with:  Ear Problem Pain    Stated Complaint: wants ears checked, x3days     HPI    Sasha Houser is a 47-year-old male who presents today for evaluation of bilateral ear conge Smoker      Smokeless tobacco: Never Used    Alcohol use: No      Alcohol/week: 0.0 standard drinks      Comment: rare    Drug use:  No             Review of Systems    Positive for stated complaint: wants ears checked, x3days   Other systems are as noted i uses hydrogen peroxide and has an instrument that he uses to flush the ear canal.  He feels comfortable doing this at home. I encouraged him to return if he has any difficulty or worsening of his symptoms.         MDM   Patient is informed my physical exam

## 2020-11-11 ENCOUNTER — HOSPITAL ENCOUNTER (EMERGENCY)
Facility: HOSPITAL | Age: 65
Discharge: HOME | End: 2020-11-11
Attending: EMERGENCY MEDICINE
Payer: COMMERCIAL

## 2020-11-11 ENCOUNTER — APPOINTMENT (OUTPATIENT)
Dept: GENERAL RADIOLOGY | Facility: HOSPITAL | Age: 65
End: 2020-11-11
Attending: EMERGENCY MEDICINE
Payer: COMMERCIAL

## 2020-11-11 ENCOUNTER — APPOINTMENT (OUTPATIENT)
Dept: MRI IMAGING | Facility: HOSPITAL | Age: 65
End: 2020-11-11
Attending: EMERGENCY MEDICINE
Payer: COMMERCIAL

## 2020-11-11 ENCOUNTER — APPOINTMENT (OUTPATIENT)
Dept: CT IMAGING | Facility: HOSPITAL | Age: 65
End: 2020-11-11
Attending: EMERGENCY MEDICINE
Payer: COMMERCIAL

## 2020-11-11 VITALS
HEART RATE: 65 BPM | WEIGHT: 195 LBS | BODY MASS INDEX: 27.3 KG/M2 | RESPIRATION RATE: 17 BRPM | HEIGHT: 71 IN | OXYGEN SATURATION: 100 % | DIASTOLIC BLOOD PRESSURE: 102 MMHG | SYSTOLIC BLOOD PRESSURE: 165 MMHG | TEMPERATURE: 98 F

## 2020-11-11 DIAGNOSIS — H81.10 BENIGN PAROXYSMAL POSITIONAL VERTIGO, UNSPECIFIED LATERALITY: Primary | ICD-10-CM

## 2020-11-11 PROCEDURE — 96374 THER/PROPH/DIAG INJ IV PUSH: CPT

## 2020-11-11 PROCEDURE — 96376 TX/PRO/DX INJ SAME DRUG ADON: CPT

## 2020-11-11 PROCEDURE — 96361 HYDRATE IV INFUSION ADD-ON: CPT

## 2020-11-11 PROCEDURE — 93010 ELECTROCARDIOGRAM REPORT: CPT

## 2020-11-11 PROCEDURE — 70546 MR ANGIOGRAPH HEAD W/O&W/DYE: CPT | Performed by: EMERGENCY MEDICINE

## 2020-11-11 PROCEDURE — 71045 X-RAY EXAM CHEST 1 VIEW: CPT | Performed by: EMERGENCY MEDICINE

## 2020-11-11 PROCEDURE — 85025 COMPLETE CBC W/AUTO DIFF WBC: CPT | Performed by: EMERGENCY MEDICINE

## 2020-11-11 PROCEDURE — 99285 EMERGENCY DEPT VISIT HI MDM: CPT

## 2020-11-11 PROCEDURE — 93005 ELECTROCARDIOGRAM TRACING: CPT

## 2020-11-11 PROCEDURE — 84484 ASSAY OF TROPONIN QUANT: CPT | Performed by: EMERGENCY MEDICINE

## 2020-11-11 PROCEDURE — 70450 CT HEAD/BRAIN W/O DYE: CPT | Performed by: EMERGENCY MEDICINE

## 2020-11-11 PROCEDURE — 82962 GLUCOSE BLOOD TEST: CPT

## 2020-11-11 PROCEDURE — 96375 TX/PRO/DX INJ NEW DRUG ADDON: CPT

## 2020-11-11 PROCEDURE — 70553 MRI BRAIN STEM W/O & W/DYE: CPT | Performed by: EMERGENCY MEDICINE

## 2020-11-11 PROCEDURE — 81001 URINALYSIS AUTO W/SCOPE: CPT | Performed by: EMERGENCY MEDICINE

## 2020-11-11 PROCEDURE — A9575 INJ GADOTERATE MEGLUMI 0.1ML: HCPCS | Performed by: EMERGENCY MEDICINE

## 2020-11-11 PROCEDURE — 80053 COMPREHEN METABOLIC PANEL: CPT | Performed by: EMERGENCY MEDICINE

## 2020-11-11 PROCEDURE — 70549 MR ANGIOGRAPH NECK W/O&W/DYE: CPT | Performed by: EMERGENCY MEDICINE

## 2020-11-11 RX ORDER — ONDANSETRON 2 MG/ML
4 INJECTION INTRAMUSCULAR; INTRAVENOUS ONCE
Status: COMPLETED | OUTPATIENT
Start: 2020-11-11 | End: 2020-11-11

## 2020-11-11 RX ORDER — METOCLOPRAMIDE HYDROCHLORIDE 5 MG/ML
INJECTION INTRAMUSCULAR; INTRAVENOUS
Status: COMPLETED
Start: 2020-11-11 | End: 2020-11-11

## 2020-11-11 RX ORDER — LORAZEPAM 0.5 MG/1
TABLET ORAL
Qty: 20 TABLET | Refills: 0 | Status: SHIPPED | OUTPATIENT
Start: 2020-11-11 | End: 2020-11-17

## 2020-11-11 RX ORDER — MECLIZINE HYDROCHLORIDE 25 MG/1
25 TABLET ORAL ONCE
Status: COMPLETED | OUTPATIENT
Start: 2020-11-11 | End: 2020-11-11

## 2020-11-11 RX ORDER — LORAZEPAM 2 MG/ML
1 INJECTION INTRAMUSCULAR ONCE
Status: COMPLETED | OUTPATIENT
Start: 2020-11-11 | End: 2020-11-11

## 2020-11-11 RX ORDER — MECLIZINE HYDROCHLORIDE 25 MG/1
25 TABLET ORAL 3 TIMES DAILY PRN
Qty: 18 TABLET | Refills: 0 | Status: SHIPPED | OUTPATIENT
Start: 2020-11-11

## 2020-11-11 RX ORDER — METOCLOPRAMIDE HYDROCHLORIDE 5 MG/ML
5 INJECTION INTRAMUSCULAR; INTRAVENOUS ONCE
Status: COMPLETED | OUTPATIENT
Start: 2020-11-11 | End: 2020-11-11

## 2020-11-11 NOTE — ED PROVIDER NOTES
Patient Seen in: [facility] Emergency Department      History   Patient presents with:  Dizziness    Stated Complaint: Pressure in head x1 day, feels light headed and unsteady, vomiting    HPI    28-year-old white male presents emerged from today fo without meningeal signs findings. Lungs are clear to auscultation bilaterally. Heart rate regular without murmur gallop or rub    Patient has no dysmetria or pronator drift.   Upper extremity motor strength biceps triceps finger and wrist extension are and intervals. Chest x-ray reveals:    FINDINGS:     LUNGS/PLEURA:  No focal consolidation.  No evidence of mass.  No pleural effusions.     CARDIAC:  Normal heart size and vascularity.    MEDIASTINUM:  Normal.   KEVIN:                        No proximal cavernous portion of the right internal carotid artery.  No significant stenosis on the left. ANTERIOR CEREBRALS:         No visible stenosis or aneurysm. ANTERIOR COMMUNICATING:  No visible stenosis or aneurysm.    MIDDLE CEREBRALS:         No clinically. 6. No intracranial aneurysm identified.                  MDM      Patient had an IV established in the emergency room was placed on a cardiac monitor had laboratory studies sent.   Initial laboratory work was generally unremarkable except for as needed for vertigo.   Qty: 20 tablet Refills: 0

## 2020-11-11 NOTE — ED NOTES
Rounded on patient. He is aware we are waiting on radiology for MRI results. Patient is resting comfortably.

## 2021-04-23 PROBLEM — M75.102 ROTATOR CUFF SYNDROME OF BOTH SHOULDERS: Status: ACTIVE | Noted: 2021-04-23

## 2021-04-23 PROBLEM — M54.12 CERVICAL RADICULOPATHY: Status: ACTIVE | Noted: 2021-04-23

## 2021-04-23 PROBLEM — M75.101 ROTATOR CUFF SYNDROME OF BOTH SHOULDERS: Status: ACTIVE | Noted: 2021-04-23

## 2021-04-23 PROBLEM — M50.30 DEGENERATIVE CERVICAL DISC: Status: ACTIVE | Noted: 2021-04-23

## 2022-04-28 ENCOUNTER — HOSPITAL ENCOUNTER (OUTPATIENT)
Age: 67
Discharge: HOME | End: 2022-04-28
Attending: EMERGENCY MEDICINE
Payer: COMMERCIAL

## 2022-04-28 ENCOUNTER — APPOINTMENT (OUTPATIENT)
Dept: GENERAL RADIOLOGY | Age: 67
End: 2022-04-28
Attending: EMERGENCY MEDICINE
Payer: COMMERCIAL

## 2022-04-28 VITALS
HEIGHT: 71 IN | SYSTOLIC BLOOD PRESSURE: 174 MMHG | HEART RATE: 75 BPM | OXYGEN SATURATION: 97 % | TEMPERATURE: 98 F | WEIGHT: 185 LBS | RESPIRATION RATE: 18 BRPM | DIASTOLIC BLOOD PRESSURE: 92 MMHG | BODY MASS INDEX: 25.9 KG/M2

## 2022-04-28 DIAGNOSIS — M54.50 BACK PAIN OF THORACOLUMBAR REGION: Primary | ICD-10-CM

## 2022-04-28 DIAGNOSIS — M54.6 BACK PAIN OF THORACOLUMBAR REGION: Primary | ICD-10-CM

## 2022-04-28 LAB
POCT BILIRUBIN URINE: NEGATIVE
POCT GLUCOSE URINE: 500 MG/DL
POCT KETONE URINE: NEGATIVE MG/DL
POCT LEUKOCYTE ESTERASE URINE: NEGATIVE
POCT NITRITE URINE: NEGATIVE
POCT PH URINE: 5.5 (ref 5–8)
POCT PROTEIN URINE: NEGATIVE MG/DL
POCT SPECIFIC GRAVITY URINE: 1.03
POCT URINE CLARITY: CLEAR
POCT UROBILINOGEN URINE: 0.2 MG/DL

## 2022-04-28 PROCEDURE — 99213 OFFICE O/P EST LOW 20 MIN: CPT

## 2022-04-28 PROCEDURE — 81002 URINALYSIS NONAUTO W/O SCOPE: CPT | Performed by: EMERGENCY MEDICINE

## 2022-04-28 PROCEDURE — 72110 X-RAY EXAM L-2 SPINE 4/>VWS: CPT | Performed by: EMERGENCY MEDICINE

## 2022-04-28 RX ORDER — METHYLPREDNISOLONE 4 MG/1
TABLET ORAL
Qty: 1 EACH | Refills: 0 | Status: SHIPPED | OUTPATIENT
Start: 2022-04-28

## 2022-04-28 RX ORDER — LOSARTAN POTASSIUM 100 MG/1
100 TABLET ORAL DAILY
COMMUNITY

## 2022-04-28 RX ORDER — CYCLOBENZAPRINE HCL 10 MG
10 TABLET ORAL 3 TIMES DAILY PRN
Qty: 20 TABLET | Refills: 0 | Status: SHIPPED | OUTPATIENT
Start: 2022-04-28 | End: 2022-05-05

## 2022-05-05 ENCOUNTER — HOSPITAL ENCOUNTER (OUTPATIENT)
Age: 67
Discharge: HOME | End: 2022-05-05
Attending: EMERGENCY MEDICINE
Payer: COMMERCIAL

## 2022-05-05 VITALS
DIASTOLIC BLOOD PRESSURE: 81 MMHG | RESPIRATION RATE: 18 BRPM | HEART RATE: 65 BPM | OXYGEN SATURATION: 99 % | SYSTOLIC BLOOD PRESSURE: 180 MMHG

## 2022-05-05 DIAGNOSIS — R73.9 HYPERGLYCEMIA: Primary | ICD-10-CM

## 2022-05-05 DIAGNOSIS — E86.0 DEHYDRATION: ICD-10-CM

## 2022-05-05 LAB
BUN BLD-MCNC: 25 MG/DL (ref 7–18)
CHLORIDE BLD-SCNC: 101 MMOL/L (ref 98–112)
CO2 BLD-SCNC: 25 MMOL/L (ref 21–32)
CREAT BLD-MCNC: 1 MG/DL
GLUCOSE BLD-MCNC: 326 MG/DL (ref 70–99)
GLUCOSE BLD-MCNC: 350 MG/DL (ref 70–99)
HCT VFR BLD CALC: 43 %
ISTAT IONIZED CALCIUM FOR CHEM 8: 1.23 MMOL/L (ref 1.12–1.32)
POTASSIUM BLD-SCNC: 3.8 MMOL/L (ref 3.6–5.1)
SODIUM BLD-SCNC: 139 MMOL/L (ref 136–145)

## 2022-05-05 PROCEDURE — 80047 BASIC METABLC PNL IONIZED CA: CPT

## 2022-05-05 PROCEDURE — 99214 OFFICE O/P EST MOD 30 MIN: CPT

## 2022-05-05 PROCEDURE — 82962 GLUCOSE BLOOD TEST: CPT

## 2022-05-05 PROCEDURE — 96360 HYDRATION IV INFUSION INIT: CPT

## 2022-05-05 RX ORDER — LOSARTAN POTASSIUM AND HYDROCHLOROTHIAZIDE 25; 100 MG/1; MG/1
1 TABLET ORAL EVERY MORNING
COMMUNITY
Start: 2022-05-04

## 2022-05-05 RX ORDER — SODIUM CHLORIDE 9 MG/ML
1000 INJECTION, SOLUTION INTRAVENOUS ONCE
Status: COMPLETED | OUTPATIENT
Start: 2022-05-05 | End: 2022-05-05

## 2022-05-06 NOTE — ED INITIAL ASSESSMENT (HPI)
C/O confusion this morning after taking the new prescription that was prescribed PCP of Losartan hydrochlorothiazide yesterday.

## 2022-08-04 ENCOUNTER — HOSPITAL ENCOUNTER (EMERGENCY)
Facility: HOSPITAL | Age: 67
Discharge: LEFT BEFORE DISCHARGE | End: 2022-08-05
Attending: EMERGENCY MEDICINE
Payer: COMMERCIAL

## 2022-08-04 ENCOUNTER — APPOINTMENT (OUTPATIENT)
Dept: GENERAL RADIOLOGY | Facility: HOSPITAL | Age: 67
End: 2022-08-04
Attending: EMERGENCY MEDICINE
Payer: COMMERCIAL

## 2022-08-04 VITALS
SYSTOLIC BLOOD PRESSURE: 167 MMHG | DIASTOLIC BLOOD PRESSURE: 95 MMHG | BODY MASS INDEX: 25.9 KG/M2 | TEMPERATURE: 98 F | RESPIRATION RATE: 22 BRPM | OXYGEN SATURATION: 96 % | WEIGHT: 185 LBS | HEART RATE: 86 BPM | HEIGHT: 71 IN

## 2022-08-04 DIAGNOSIS — R07.89 CHEST PAIN, ATYPICAL: ICD-10-CM

## 2022-08-04 DIAGNOSIS — M54.12 CERVICAL RADICULOPATHY: Primary | ICD-10-CM

## 2022-08-04 LAB
ALBUMIN SERPL-MCNC: 3.7 G/DL (ref 3.4–5)
ALBUMIN/GLOB SERPL: 1 {RATIO} (ref 1–2)
ALP LIVER SERPL-CCNC: 148 U/L
ALT SERPL-CCNC: 44 U/L
ANION GAP SERPL CALC-SCNC: 6 MMOL/L (ref 0–18)
AST SERPL-CCNC: 28 U/L (ref 15–37)
BASOPHILS # BLD AUTO: 0.03 X10(3) UL (ref 0–0.2)
BASOPHILS NFR BLD AUTO: 0.2 %
BILIRUB SERPL-MCNC: 0.3 MG/DL (ref 0.1–2)
BUN BLD-MCNC: 27 MG/DL (ref 7–18)
CALCIUM BLD-MCNC: 9 MG/DL (ref 8.5–10.1)
CHLORIDE SERPL-SCNC: 109 MMOL/L (ref 98–112)
CO2 SERPL-SCNC: 26 MMOL/L (ref 21–32)
CREAT BLD-MCNC: 1.37 MG/DL
D DIMER PPP FEU-MCNC: 0.52 UG/ML FEU (ref ?–0.68)
EOSINOPHIL # BLD AUTO: 0.12 X10(3) UL (ref 0–0.7)
EOSINOPHIL NFR BLD AUTO: 0.8 %
ERYTHROCYTE [DISTWIDTH] IN BLOOD BY AUTOMATED COUNT: 12.3 %
GFR SERPLBLD BASED ON 1.73 SQ M-ARVRAT: 56 ML/MIN/1.73M2 (ref 60–?)
GLOBULIN PLAS-MCNC: 3.7 G/DL (ref 2.8–4.4)
GLUCOSE BLD-MCNC: 230 MG/DL (ref 70–99)
HCT VFR BLD AUTO: 44.1 %
HGB BLD-MCNC: 14.7 G/DL
IMM GRANULOCYTES # BLD AUTO: 0.04 X10(3) UL (ref 0–1)
IMM GRANULOCYTES NFR BLD: 0.3 %
LIPASE SERPL-CCNC: 180 U/L (ref 73–393)
LYMPHOCYTES # BLD AUTO: 1.68 X10(3) UL (ref 1–4)
LYMPHOCYTES NFR BLD AUTO: 11.4 %
MCH RBC QN AUTO: 30.1 PG (ref 26–34)
MCHC RBC AUTO-ENTMCNC: 33.3 G/DL (ref 31–37)
MCV RBC AUTO: 90.4 FL
MONOCYTES # BLD AUTO: 1.01 X10(3) UL (ref 0.1–1)
MONOCYTES NFR BLD AUTO: 6.9 %
NEUTROPHILS # BLD AUTO: 11.8 X10 (3) UL (ref 1.5–7.7)
NEUTROPHILS # BLD AUTO: 11.8 X10(3) UL (ref 1.5–7.7)
NEUTROPHILS NFR BLD AUTO: 80.4 %
OSMOLALITY SERPL CALC.SUM OF ELEC: 304 MOSM/KG (ref 275–295)
PLATELET # BLD AUTO: 204 10(3)UL (ref 150–450)
POTASSIUM SERPL-SCNC: 4.4 MMOL/L (ref 3.5–5.1)
PROT SERPL-MCNC: 7.4 G/DL (ref 6.4–8.2)
RBC # BLD AUTO: 4.88 X10(6)UL
SARS-COV-2 RNA RESP QL NAA+PROBE: NOT DETECTED
SODIUM SERPL-SCNC: 141 MMOL/L (ref 136–145)
TROPONIN I HIGH SENSITIVITY: 7 NG/L
WBC # BLD AUTO: 14.7 X10(3) UL (ref 4–11)

## 2022-08-04 PROCEDURE — 83690 ASSAY OF LIPASE: CPT | Performed by: EMERGENCY MEDICINE

## 2022-08-04 PROCEDURE — 96374 THER/PROPH/DIAG INJ IV PUSH: CPT

## 2022-08-04 PROCEDURE — 99284 EMERGENCY DEPT VISIT MOD MDM: CPT

## 2022-08-04 PROCEDURE — 84484 ASSAY OF TROPONIN QUANT: CPT | Performed by: EMERGENCY MEDICINE

## 2022-08-04 PROCEDURE — 93005 ELECTROCARDIOGRAM TRACING: CPT

## 2022-08-04 PROCEDURE — 72050 X-RAY EXAM NECK SPINE 4/5VWS: CPT | Performed by: EMERGENCY MEDICINE

## 2022-08-04 PROCEDURE — 80053 COMPREHEN METABOLIC PANEL: CPT | Performed by: EMERGENCY MEDICINE

## 2022-08-04 PROCEDURE — 71045 X-RAY EXAM CHEST 1 VIEW: CPT | Performed by: EMERGENCY MEDICINE

## 2022-08-04 PROCEDURE — 85025 COMPLETE CBC W/AUTO DIFF WBC: CPT | Performed by: EMERGENCY MEDICINE

## 2022-08-04 PROCEDURE — 93010 ELECTROCARDIOGRAM REPORT: CPT

## 2022-08-04 PROCEDURE — 85379 FIBRIN DEGRADATION QUANT: CPT | Performed by: EMERGENCY MEDICINE

## 2022-08-04 RX ORDER — MAGNESIUM HYDROXIDE/ALUMINUM HYDROXICE/SIMETHICONE 120; 1200; 1200 MG/30ML; MG/30ML; MG/30ML
30 SUSPENSION ORAL ONCE
Status: COMPLETED | OUTPATIENT
Start: 2022-08-04 | End: 2022-08-04

## 2022-08-04 RX ORDER — KETOROLAC TROMETHAMINE 15 MG/ML
15 INJECTION, SOLUTION INTRAMUSCULAR; INTRAVENOUS ONCE
Status: COMPLETED | OUTPATIENT
Start: 2022-08-04 | End: 2022-08-04

## 2022-08-05 LAB
ATRIAL RATE: 81 BPM
P AXIS: -29 DEGREES
P-R INTERVAL: 128 MS
Q-T INTERVAL: 376 MS
QRS DURATION: 96 MS
QTC CALCULATION (BEZET): 436 MS
R AXIS: 31 DEGREES
T AXIS: 40 DEGREES
TROPONIN I HIGH SENSITIVITY: 9 NG/L
VENTRICULAR RATE: 81 BPM

## 2022-08-05 NOTE — ED INITIAL ASSESSMENT (HPI)
Patient arrived to the ED from home with c/o left arm and shoulder pain that radiates to the middle of chest. Describes the pain as dull and radiating. States he was a little lightheaded. Denies taking any pain medication at home. AAOx4, rates pain 6/10, and EKG complete.

## 2022-09-04 ENCOUNTER — HOSPITAL ENCOUNTER (OUTPATIENT)
Age: 67
Discharge: HOME | End: 2022-09-04
Payer: COMMERCIAL

## 2022-09-04 VITALS
HEIGHT: 71 IN | TEMPERATURE: 98 F | HEART RATE: 59 BPM | RESPIRATION RATE: 20 BRPM | OXYGEN SATURATION: 99 % | DIASTOLIC BLOOD PRESSURE: 78 MMHG | SYSTOLIC BLOOD PRESSURE: 160 MMHG | BODY MASS INDEX: 25.9 KG/M2 | WEIGHT: 185 LBS

## 2022-09-04 DIAGNOSIS — H60.501 ACUTE OTITIS EXTERNA OF RIGHT EAR, UNSPECIFIED TYPE: ICD-10-CM

## 2022-09-04 DIAGNOSIS — H61.21 IMPACTED CERUMEN OF RIGHT EAR: Primary | ICD-10-CM

## 2022-09-04 PROCEDURE — 99213 OFFICE O/P EST LOW 20 MIN: CPT

## 2022-09-04 PROCEDURE — 69209 REMOVE IMPACTED EAR WAX UNI: CPT

## 2022-09-04 RX ORDER — CIPROFLOXACIN AND DEXAMETHASONE 3; 1 MG/ML; MG/ML
4 SUSPENSION/ DROPS AURICULAR (OTIC) 2 TIMES DAILY
Qty: 1 EACH | Refills: 0 | Status: SHIPPED | OUTPATIENT
Start: 2022-09-04 | End: 2022-09-11

## 2022-09-04 NOTE — ED INITIAL ASSESSMENT (HPI)
Patient reports he started yesterday with a light headed feeling. Patient reports today his ear is starting to throb.

## 2022-11-07 ENCOUNTER — HOSPITAL ENCOUNTER (OUTPATIENT)
Age: 67
Discharge: HOME | End: 2022-11-07
Payer: MEDICARE

## 2022-11-07 ENCOUNTER — APPOINTMENT (OUTPATIENT)
Dept: GENERAL RADIOLOGY | Age: 67
End: 2022-11-07
Attending: NURSE PRACTITIONER
Payer: MEDICARE

## 2022-11-07 VITALS
DIASTOLIC BLOOD PRESSURE: 80 MMHG | HEART RATE: 78 BPM | SYSTOLIC BLOOD PRESSURE: 158 MMHG | OXYGEN SATURATION: 97 % | RESPIRATION RATE: 18 BRPM | BODY MASS INDEX: 25.9 KG/M2 | HEIGHT: 71 IN | WEIGHT: 185 LBS | TEMPERATURE: 99 F

## 2022-11-07 DIAGNOSIS — R50.9 FEVER, UNSPECIFIED FEVER CAUSE: Primary | ICD-10-CM

## 2022-11-07 DIAGNOSIS — R52 BODY ACHES: ICD-10-CM

## 2022-11-07 LAB
#MXD IC: 0.7 X10ˆ3/UL (ref 0.1–1)
BUN BLD-MCNC: 19 MG/DL (ref 7–18)
CHLORIDE BLD-SCNC: 102 MMOL/L (ref 98–112)
CO2 BLD-SCNC: 26 MMOL/L (ref 21–32)
CREAT BLD-MCNC: 0.9 MG/DL
GFR SERPLBLD BASED ON 1.73 SQ M-ARVRAT: 92 ML/MIN/1.73M2 (ref 60–?)
GLUCOSE BLD-MCNC: 147 MG/DL (ref 70–99)
HCT VFR BLD AUTO: 46.1 %
HCT VFR BLD CALC: 47 %
HGB BLD-MCNC: 14.8 G/DL
ISTAT IONIZED CALCIUM FOR CHEM 8: 1.2 MMOL/L (ref 1.12–1.32)
LYMPHOCYTES # BLD AUTO: 0.7 X10ˆ3/UL (ref 1–4)
LYMPHOCYTES NFR BLD AUTO: 7.3 %
MCH RBC QN AUTO: 28.7 PG (ref 26–34)
MCHC RBC AUTO-ENTMCNC: 32.1 G/DL (ref 31–37)
MCV RBC AUTO: 89.3 FL (ref 80–100)
MIXED CELL %: 7.4 %
NEUTROPHILS # BLD AUTO: 7.6 X10ˆ3/UL (ref 1.5–7.7)
NEUTROPHILS NFR BLD AUTO: 85.3 %
PLATELET # BLD AUTO: 189 X10ˆ3/UL (ref 150–450)
POCT BILIRUBIN URINE: NEGATIVE
POCT GLUCOSE URINE: NEGATIVE MG/DL
POCT INFLUENZA A: NEGATIVE
POCT INFLUENZA B: NEGATIVE
POCT KETONE URINE: NEGATIVE MG/DL
POCT LEUKOCYTE ESTERASE URINE: NEGATIVE
POCT NITRITE URINE: NEGATIVE
POCT PH URINE: 5.5 (ref 5–8)
POCT PROTEIN URINE: NEGATIVE MG/DL
POCT SPECIFIC GRAVITY URINE: 1.02
POCT URINE CLARITY: CLEAR
POCT URINE COLOR: YELLOW
POCT UROBILINOGEN URINE: 0.2 MG/DL
POTASSIUM BLD-SCNC: 4 MMOL/L (ref 3.6–5.1)
RBC # BLD AUTO: 5.16 X10ˆ6/UL
SARS-COV-2 RNA RESP QL NAA+PROBE: NOT DETECTED
SODIUM BLD-SCNC: 138 MMOL/L (ref 136–145)
WBC # BLD AUTO: 9 X10ˆ3/UL (ref 4–11)

## 2022-11-07 PROCEDURE — 71046 X-RAY EXAM CHEST 2 VIEWS: CPT | Performed by: NURSE PRACTITIONER

## 2022-11-07 PROCEDURE — 87502 INFLUENZA DNA AMP PROBE: CPT | Performed by: NURSE PRACTITIONER

## 2022-11-07 PROCEDURE — 85025 COMPLETE CBC W/AUTO DIFF WBC: CPT | Performed by: NURSE PRACTITIONER

## 2022-11-07 PROCEDURE — 80047 BASIC METABLC PNL IONIZED CA: CPT

## 2022-11-07 PROCEDURE — 99214 OFFICE O/P EST MOD 30 MIN: CPT

## 2022-11-07 PROCEDURE — 36415 COLL VENOUS BLD VENIPUNCTURE: CPT

## 2022-11-07 PROCEDURE — 81002 URINALYSIS NONAUTO W/O SCOPE: CPT | Performed by: NURSE PRACTITIONER

## 2022-11-07 PROCEDURE — 99213 OFFICE O/P EST LOW 20 MIN: CPT

## 2022-11-07 NOTE — DISCHARGE INSTRUCTIONS
Tylenol and Motrin alternating. Increase oral fluids. If any symptoms change return for recheck. Close follow-up with your primary care doctor.

## 2022-12-04 NOTE — ED NOTES
Pt drinking second bottle of barium. [facility] CARDIOLOGY PROGRESS NOTE           12/4/2022 9:14 AM    Admit Date: 12/3/2022      Subjective:   -Blood pressures have been reasonably well controlled overnight. She says her breathing is better.  -Weight charted at 195 pounds today compared to 119 pounds yesterday  -Urine output charted at 1550 cc with no significant intake. ROS:  Cardiovascular:  As noted above    Objective:      Vitals:    12/04/22 0730 12/04/22 0800 12/04/22 0836 12/04/22 0901   BP: 132/62      Pulse: 65 63     Resp: (!) 32 (!) 36 27    Temp:    98.4 °F (36.9 °C)   TempSrc:    Axillary   SpO2: 94% 92%     Weight:       Height:           Physical Exam:  General-No Acute Distress  Neck- supple, no JVD  CV- regular rate and rhythm no MRG  Lung- clear bilaterally  Abd- soft, nontender, nondistended  Ext- no edema bilaterally. Skin- warm and dry    Data Review:     Lab Results   Component Value Date/Time     12/04/2022 03:46 AM    K 3.0 12/04/2022 03:46 AM    CL 99 12/04/2022 03:46 AM    CO2 35 12/04/2022 03:46 AM    BUN 17 12/04/2022 03:46 AM    CREATININE 0.90 12/04/2022 03:46 AM    GLUCOSE 102 12/04/2022 03:46 AM    CALCIUM 9.2 12/04/2022 03:46 AM         Lab Results   Component Value Date    WBC 9.0 12/04/2022    HGB 7.8 (L) 12/04/2022    HCT 26.2 (L) 12/04/2022    MCV 93.9 12/04/2022     12/04/2022 08/08/22    TRANSTHORACIC ECHOCARDIOGRAM (TTE) COMPLETE (CONTRAST/BUBBLE/3D PRN) 08/09/2022  7:31 AM, 08/09/2022 12:00 AM (Final)    Interpretation Summary    Left Ventricle: Normal left ventricular systolic function with a visually estimated EF of 55 - 60%. Left ventricle size is normal. Mildly increased wall thickness. Normal wall motion. Normal diastolic function. Tricuspid Valve: The estimated RVSP is 15 mmHg. Signed by:  Jordi Harris MD on 8/9/2022  7:31 AM, Signed by: Unknown Provider Result on 8/9/2022 12:00 AM     Assessment/Plan:          Principal Problem:    Acute hypoxic respiratory failure (HCC)  -Oxygen saturations reported on room air were in the 50s to 70s range.  -Has received IV Lasix-strict intake and output and daily weights     Active Problems:    Pacemaker/ History of symptomatic bradycardia  -S/p recent permanent pacemaker placement for sick sinus syndrome       Anemia, unspecified  -Uncertain etiology-on ferrous sulfate 325 mg once daily. - Will give 1 dose of IV iron. Hypokalemia  -Being replaced. Paroxysmal atrial fibrillation (HCC)  -Maintaining sinus rhythm with flecainide 75 mg twice daily along with metoprolol succinate 50 mg once daily, anticoagulated with Eliquis for thromboembolic prophylaxis. HTN (hypertension), benign  -Borderline elevated pressures noted-continue metoprolol succinate, has received IV Lasix, will consider IV nitroglycerin drip       Long term (current) use of anticoagulants  -Usually on Eliquis but if there is any further drop in hemoglobin levels, we will hold it. Fortunately maintaining sinus rhythm     -Plan today: Diuresing with IV Lasix. Continue 40 mg IV twice daily, strict intake and output and daily weights, replace potassium-40 mill equivalents x2 today. Replace magnesium-2 g IV x1 today.  -I will give her 1 dose of IV iron with iron studies showing evidence of iron deficiency. -Oxygen requirement should improve dramatically if all her symptoms are related to congestive heart failure-would recommend considering a CT scan of the chest if she continues to require CPAP/BiPAP therapy by later this evening.     Carlie Brian MD  12/4/2022 9:14 AM

## 2023-02-11 ENCOUNTER — HOSPITAL ENCOUNTER (EMERGENCY)
Facility: HOSPITAL | Age: 68
Discharge: HOME | End: 2023-02-11
Attending: EMERGENCY MEDICINE
Payer: MEDICARE

## 2023-02-11 ENCOUNTER — APPOINTMENT (OUTPATIENT)
Dept: GENERAL RADIOLOGY | Facility: HOSPITAL | Age: 68
End: 2023-02-11
Attending: EMERGENCY MEDICINE
Payer: MEDICARE

## 2023-02-11 VITALS
SYSTOLIC BLOOD PRESSURE: 175 MMHG | TEMPERATURE: 98 F | RESPIRATION RATE: 20 BRPM | HEART RATE: 70 BPM | DIASTOLIC BLOOD PRESSURE: 81 MMHG | OXYGEN SATURATION: 96 %

## 2023-02-11 DIAGNOSIS — S61.011A LACERATION OF RIGHT THUMB WITHOUT FOREIGN BODY WITHOUT DAMAGE TO NAIL, INITIAL ENCOUNTER: Primary | ICD-10-CM

## 2023-02-11 PROCEDURE — 99284 EMERGENCY DEPT VISIT MOD MDM: CPT

## 2023-02-11 PROCEDURE — 99283 EMERGENCY DEPT VISIT LOW MDM: CPT

## 2023-02-11 PROCEDURE — 90471 IMMUNIZATION ADMIN: CPT

## 2023-02-11 PROCEDURE — 73140 X-RAY EXAM OF FINGER(S): CPT | Performed by: EMERGENCY MEDICINE

## 2023-02-11 RX ORDER — LIDOCAINE HYDROCHLORIDE 10 MG/ML
INJECTION, SOLUTION INFILTRATION; PERINEURAL
Status: COMPLETED
Start: 2023-02-11 | End: 2023-02-11

## 2023-02-11 RX ORDER — LIDOCAINE HYDROCHLORIDE 10 MG/ML
20 INJECTION, SOLUTION INFILTRATION; PERINEURAL ONCE
Status: COMPLETED | OUTPATIENT
Start: 2023-02-11 | End: 2023-02-11

## 2023-02-11 NOTE — ED INITIAL ASSESSMENT (HPI)
Pt to the ER via walk in for a lac t left hand thumb after getting it stuck on a sliding glass door. Denies thinners    Dressing changed in triage.  Pt still experiencing heavy bleeding

## 2023-02-11 NOTE — DISCHARGE INSTRUCTIONS
Follow-up for further evaluation of the primary physician and orthopedics as discussed. Call for appointment. Return if new or worse symptoms. Elevate, ibuprofen. Dressing change twice daily.

## 2024-06-17 ENCOUNTER — APPOINTMENT (OUTPATIENT)
Dept: GENERAL RADIOLOGY | Age: 69
End: 2024-06-17
Attending: NURSE PRACTITIONER

## 2024-06-17 ENCOUNTER — HOSPITAL ENCOUNTER (OUTPATIENT)
Age: 69
Discharge: HOME | End: 2024-06-17

## 2024-06-17 VITALS
HEIGHT: 71 IN | DIASTOLIC BLOOD PRESSURE: 77 MMHG | WEIGHT: 180 LBS | BODY MASS INDEX: 25.2 KG/M2 | TEMPERATURE: 99 F | SYSTOLIC BLOOD PRESSURE: 139 MMHG | HEART RATE: 66 BPM | RESPIRATION RATE: 20 BRPM | OXYGEN SATURATION: 97 %

## 2024-06-17 DIAGNOSIS — S20.211A RIB CONTUSION, RIGHT, INITIAL ENCOUNTER: Primary | ICD-10-CM

## 2024-06-17 PROCEDURE — 71101 X-RAY EXAM UNILAT RIBS/CHEST: CPT | Performed by: NURSE PRACTITIONER

## 2024-06-17 PROCEDURE — 99213 OFFICE O/P EST LOW 20 MIN: CPT

## 2024-06-17 RX ORDER — LOSARTAN POTASSIUM 100 MG/1
100 TABLET ORAL DAILY
COMMUNITY
Start: 2024-01-02

## 2024-06-17 NOTE — ED PROVIDER NOTES
Patient Seen in: [facility]      History     Chief Complaint   Patient presents with    Fall     Stated Complaint: fell on right rib cage    Subjective:   HPI  69 yo male with kidney stones, dm, hld, and htn complaining of right lateral rib pain status post trip and fall on Thursday where he fell over his luggage and a planter landing on his right side.  He denies any head injury or loss of consciousness.  He states that he has some pain to the ribs with movement and deep breaths.  He denies any shortness of breath.  He denies any other injuries.      Objective:   Past Medical History:    Abdominal pain    Belching    Bleeding nose    Bloating    Blood in urine    Blurred vision    Calculus of kidney    Diabetes mellitus (HCC)    Disorder of prostate    Fatigue    Fever    Flatulence/gas pain/belching    Hearing loss    Heartburn    Hemorrhoids    High cholesterol    Indigestion    Kidney stones    Loss of appetite    Nausea    Night sweats    OTHER DISEASES    Diverticulosis and Diverticulitis    Painful urination    Sleep disturbance    Type II or unspecified type diabetes mellitus without mention of complication, not stated as uncontrolled    diagnosed last year march not on any medication     Uncomfortable fullness after meals    Unspecified essential hypertension    Wears glasses    Weight loss              Past Surgical History:   Procedure Laterality Date    Colonoscopy                  Social History     Socioeconomic History    Marital status: Single   Tobacco Use    Smoking status: Never    Smokeless tobacco: Never   Vaping Use    Vaping status: Never Used   Substance and Sexual Activity    Alcohol use: No     Alcohol/week: 0.0 standard drinks of alcohol     Comment: rare    Drug use: Yes     Types: Cannabis    Sexual activity: Yes     Partners: Female   Other Topics Concern     Service No    Blood Transfusions No    Caffeine Concern Yes     Comment: 1 cup per day     Occupational  Exposure No     Comment: heavy dust, chemicals    Hobby Hazards No    Sleep Concern No    Stress Concern No    Weight Concern No    Special Diet No    Back Care Yes     Comment: all the time per pt    Exercise Yes     Comment: ocassional    Bike Helmet Yes    Seat Belt Yes    Self-Exams Yes              Review of Systems   All other systems reviewed and are negative.      Positive for stated complaint: fell on right rib cage  Other systems are as noted in HPI.  Constitutional and vital signs reviewed.      All other systems reviewed and negative except as noted above.    Physical Exam     ED Triage Vitals [06/17/24 1016]   /77   Pulse 66   Resp 20   Temp 98.5 °F (36.9 °C)   Temp src Temporal   SpO2 97 %   O2 Device None (Room air)       Current Vitals:   Vital Signs  BP: 139/77  Pulse: 66  Resp: 20  Temp: 98.5 °F (36.9 °C)  Temp src: Temporal    Oxygen Therapy  SpO2: 97 %  O2 Device: None (Room air)            Physical Exam  Vitals and nursing note reviewed.   Constitutional:       General: He is not in acute distress.     Appearance: He is well-developed. He is not ill-appearing or toxic-appearing.   HENT:      Head: Atraumatic.   Cardiovascular:      Rate and Rhythm: Normal rate and regular rhythm.      Heart sounds: Normal heart sounds.   Pulmonary:      Effort: Pulmonary effort is normal. No respiratory distress.      Breath sounds: Normal breath sounds.      Comments: Right upper lateral rib tenderness with no ecchymosis, flail chest, or crepitus  Abdominal:      Tenderness: There is no abdominal tenderness.   Musculoskeletal:      Cervical back: Normal range of motion. No tenderness.   Skin:     General: Skin is warm and dry.   Neurological:      Mental Status: He is alert and oriented to person, place, and time.               ED Course   Labs Reviewed - No data to display          XR RIBS WITH CHEST (3 VIEWS), RIGHT  (CPT=71101)    Result Date: 6/17/2024  PROCEDURE:  XR RIBS WITH CHEST (3 VIEWS), RIGHT   (CPT=71101)  TECHNIQUE:  PA Chest and three views of the ribs were obtained  COMPARISON:  [facility], XR, XR CHEST PA + LAT CHEST (ATM=65775), 11/07/2022, 9:28 AM.  [facility] , XR, XR CHEST AP PORTABLE  (CPT=71045), 8/04/2022, 10:23 PM.  INDICATIONS:  fell on right rib cage  PATIENT STATED HISTORY: (As transcribed by Technologist)  Right axillary rib pain post fall.    FINDINGS:  No focal consolidation.  No pleural effusion.  No pneumothorax.  No pulmonary edema.  The cardiomediastinal silhouette is within normal limits.  No evidence of displaced right rib fracture..            CONCLUSION:  No acute cardiopulmonary findings.  No evidence of displaced right rib fracture.   LOCATION:  [facility]     Dictated by (CST): Jose Jensen MD on 6/17/2024 at 11:03 AM     Finalized by (CST): Jose Jensen MD on 6/17/2024 at 11:05 AM               MDM     Medical Decision Making  69 yo male with kidney stones, dm, hld, and htn complaining of right lateral rib pain status post trip and fall on Thursday where he fell over his luggage and a planter landing on his right side.  He denies any head injury or loss of consciousness.  He states that he has some pain to the ribs with movement and deep breaths.  He denies any shortness of breath.  He denies any other injuries.      Clinical impression: Rib contusion    Differential diagnosis: Rib contusion, rib fracture, pneumothorax    Chest x-ray with no rib fracture.  Patient without hypoxia.  There is no abdominal tenderness on exam.  Patient discharged and instructed on deep breathing.  Patient declined incentive spirometer usage.  He works out daily.  I discussed with patient pain management with Tylenol and Motrin and splinting when needed.  Patient instructed on follow-up with his primary care doctor.    Problems Addressed:  Rib contusion, right, initial encounter: acute illness or injury    Amount and/or Complexity of Data Reviewed  Radiology: ordered and independent interpretation  performed.     Details: Chest x-ray ordered and independently interpreted by myself is no fracture        Disposition and Plan     Clinical Impression:  1. Rib contusion, right, initial encounter         Disposition:  Discharge  6/17/2024 11:14 am    Follow-up:  Maximo Mcduffie MD  [address] [address]  [phone]    Call   As needed          Medications Prescribed:  Discharge Medication List as of 6/17/2024 11:25 AM

## 2024-06-17 NOTE — ED QUICK NOTES
Patient refuses the incentive spirometer, as he works out on a rowing machine twice daily at 30 minutes a time. CEM Cervantes aware.

## 2024-06-17 NOTE — DISCHARGE INSTRUCTIONS
Use the incentive spirometer 10 times an hour while awake.  Tylenol and Motrin as needed for pain.  Follow-up with your primary care doctor as needed.

## 2024-06-17 NOTE — ED INITIAL ASSESSMENT (HPI)
Patient states on 6/14/24 he fell from standing down onto concrete, when he was tripped from behind. States he was coming out of a hotel when his foot caught someone else's suitcase. States he landed on the right ribs. States he has not used anything for pain.

## 2024-07-03 ENCOUNTER — HOSPITAL ENCOUNTER (EMERGENCY)
Facility: HOSPITAL | Age: 69
Discharge: HOME | End: 2024-07-03
Attending: EMERGENCY MEDICINE
Payer: MEDICARE

## 2024-07-03 ENCOUNTER — APPOINTMENT (OUTPATIENT)
Dept: CT IMAGING | Facility: HOSPITAL | Age: 69
End: 2024-07-03
Attending: EMERGENCY MEDICINE
Payer: MEDICARE

## 2024-07-03 VITALS
HEART RATE: 68 BPM | BODY MASS INDEX: 24.92 KG/M2 | DIASTOLIC BLOOD PRESSURE: 74 MMHG | TEMPERATURE: 98 F | HEIGHT: 71 IN | RESPIRATION RATE: 18 BRPM | WEIGHT: 178 LBS | OXYGEN SATURATION: 97 % | SYSTOLIC BLOOD PRESSURE: 153 MMHG

## 2024-07-03 DIAGNOSIS — S39.012A BACK STRAIN, INITIAL ENCOUNTER: Primary | ICD-10-CM

## 2024-07-03 LAB
ALBUMIN SERPL-MCNC: 3.5 G/DL (ref 3.4–5)
ALBUMIN/GLOB SERPL: 0.9 {RATIO} (ref 1–2)
ALP LIVER SERPL-CCNC: 122 U/L
ALT SERPL-CCNC: 37 U/L
ANION GAP SERPL CALC-SCNC: 4 MMOL/L (ref 0–18)
AST SERPL-CCNC: 19 U/L (ref 15–37)
BASOPHILS # BLD AUTO: 0.03 X10(3) UL (ref 0–0.2)
BASOPHILS NFR BLD AUTO: 0.4 %
BILIRUB SERPL-MCNC: 0.3 MG/DL (ref 0.1–2)
BILIRUB UR QL STRIP.AUTO: NEGATIVE
BUN BLD-MCNC: 30 MG/DL (ref 9–23)
CALCIUM BLD-MCNC: 9.3 MG/DL (ref 8.5–10.1)
CHLORIDE SERPL-SCNC: 110 MMOL/L (ref 98–112)
CLARITY UR REFRACT.AUTO: CLEAR
CO2 SERPL-SCNC: 27 MMOL/L (ref 21–32)
CREAT BLD-MCNC: 1.04 MG/DL
EGFRCR SERPLBLD CKD-EPI 2021: 77 ML/MIN/1.73M2 (ref 60–?)
EOSINOPHIL # BLD AUTO: 0.11 X10(3) UL (ref 0–0.7)
EOSINOPHIL NFR BLD AUTO: 1.6 %
ERYTHROCYTE [DISTWIDTH] IN BLOOD BY AUTOMATED COUNT: 12.9 %
GLOBULIN PLAS-MCNC: 3.7 G/DL (ref 2.8–4.4)
GLUCOSE BLD-MCNC: 153 MG/DL (ref 70–99)
GLUCOSE UR STRIP.AUTO-MCNC: 100 MG/DL
HCT VFR BLD AUTO: 40.7 %
HGB BLD-MCNC: 14.2 G/DL
IMM GRANULOCYTES # BLD AUTO: 0.02 X10(3) UL (ref 0–1)
IMM GRANULOCYTES NFR BLD: 0.3 %
KETONES UR STRIP.AUTO-MCNC: NEGATIVE MG/DL
LEUKOCYTE ESTERASE UR QL STRIP.AUTO: NEGATIVE
LIPASE SERPL-CCNC: 74 U/L (ref 13–75)
LYMPHOCYTES # BLD AUTO: 1.47 X10(3) UL (ref 1–4)
LYMPHOCYTES NFR BLD AUTO: 20.7 %
MCH RBC QN AUTO: 30.4 PG (ref 26–34)
MCHC RBC AUTO-ENTMCNC: 34.9 G/DL (ref 31–37)
MCV RBC AUTO: 87.2 FL
MONOCYTES # BLD AUTO: 0.61 X10(3) UL (ref 0.1–1)
MONOCYTES NFR BLD AUTO: 8.6 %
NEUTROPHILS # BLD AUTO: 4.85 X10 (3) UL (ref 1.5–7.7)
NEUTROPHILS # BLD AUTO: 4.85 X10(3) UL (ref 1.5–7.7)
NEUTROPHILS NFR BLD AUTO: 68.4 %
NITRITE UR QL STRIP.AUTO: NEGATIVE
OSMOLALITY SERPL CALC.SUM OF ELEC: 301 MOSM/KG (ref 275–295)
PH UR STRIP.AUTO: 5.5 [PH] (ref 5–8)
PLATELET # BLD AUTO: 203 10(3)UL (ref 150–450)
POTASSIUM SERPL-SCNC: 4.2 MMOL/L (ref 3.5–5.1)
PROT SERPL-MCNC: 7.2 G/DL (ref 6.4–8.2)
PROT UR STRIP.AUTO-MCNC: NEGATIVE MG/DL
RBC # BLD AUTO: 4.67 X10(6)UL
RBC UR QL AUTO: NEGATIVE
SODIUM SERPL-SCNC: 141 MMOL/L (ref 136–145)
SP GR UR STRIP.AUTO: 1.03 (ref 1–1.03)
UROBILINOGEN UR STRIP.AUTO-MCNC: NORMAL MG/DL
WBC # BLD AUTO: 7.1 X10(3) UL (ref 4–11)

## 2024-07-03 PROCEDURE — 74176 CT ABD & PELVIS W/O CONTRAST: CPT | Performed by: EMERGENCY MEDICINE

## 2024-07-03 PROCEDURE — 99285 EMERGENCY DEPT VISIT HI MDM: CPT

## 2024-07-03 PROCEDURE — 93010 ELECTROCARDIOGRAM REPORT: CPT

## 2024-07-03 PROCEDURE — 36415 COLL VENOUS BLD VENIPUNCTURE: CPT

## 2024-07-03 PROCEDURE — 93005 ELECTROCARDIOGRAM TRACING: CPT

## 2024-07-03 PROCEDURE — 83690 ASSAY OF LIPASE: CPT | Performed by: EMERGENCY MEDICINE

## 2024-07-03 PROCEDURE — 80053 COMPREHEN METABOLIC PANEL: CPT | Performed by: EMERGENCY MEDICINE

## 2024-07-03 PROCEDURE — 81003 URINALYSIS AUTO W/O SCOPE: CPT | Performed by: EMERGENCY MEDICINE

## 2024-07-03 PROCEDURE — 99284 EMERGENCY DEPT VISIT MOD MDM: CPT

## 2024-07-03 PROCEDURE — 85025 COMPLETE CBC W/AUTO DIFF WBC: CPT | Performed by: EMERGENCY MEDICINE

## 2024-07-03 NOTE — ED INITIAL ASSESSMENT (HPI)
Pt was seen 2 wks ago for a fall and had XR done which were neg. Pt went to IC yesterday again for increasing pain and they didn't show anything. Pt stated he felt like \"a rubber pain popped inside.\" Pt c/o severe pain 8/10 on the R lower abd and lower back.

## 2024-07-04 NOTE — ED PROVIDER NOTES
Patient Seen in: [facility] Emergency Department      History     Chief Complaint   Patient presents with    Pain     Stated Complaint: pain    Subjective:   HPI    68-year-old male presents to the emergency department for evaluation of a sudden onset of right flank pain tonight.  The patient has had some pain on the right side of his chest for the past couple of weeks after he accidentally fell and hit his left side on the edge of an object.  At that time and again yesterday he had x-rays of his chest and ribs which revealed no displaced rib fracture or pneumothorax.  Pain has persisted which is why he saw his physician in follow-up yesterday.  Today he stated that he was sitting on a couch just prior to arrival in the emergency department and felt a pop and excruciating pain in the right flank.  This has subsequently improved but pain is still worse with movement.  No GI or  complaints.  No shortness of breath.  No pain with deep inspiration.  He does have prescriptions for a muscle relaxant that he recently filled but has not started to take.    Objective:   Past Medical History:    Abdominal pain    Belching    Bleeding nose    Bloating    Blood in urine    Blurred vision    Calculus of kidney    Diabetes mellitus (HCC)    Disorder of prostate    Fatigue    Fever    Flatulence/gas pain/belching    Hearing loss    Heartburn    Hemorrhoids    High cholesterol    Indigestion    Kidney stones    Loss of appetite    Nausea    Night sweats    OTHER DISEASES    Diverticulosis and Diverticulitis    Painful urination    Sleep disturbance    Type II or unspecified type diabetes mellitus without mention of complication, not stated as uncontrolled    diagnosed last year march not on any medication     Uncomfortable fullness after meals    Unspecified essential hypertension    Wears glasses    Weight loss              Past Surgical History:   Procedure Laterality Date    Colonoscopy                  Social History      Socioeconomic History    Marital status: Single   Tobacco Use    Smoking status: Never    Smokeless tobacco: Never   Vaping Use    Vaping status: Never Used   Substance and Sexual Activity    Alcohol use: No     Alcohol/week: 0.0 standard drinks of alcohol     Comment: rare    Drug use: Yes     Types: Cannabis    Sexual activity: Yes     Partners: Female   Other Topics Concern     Service No    Blood Transfusions No    Caffeine Concern Yes     Comment: 1 cup per day     Occupational Exposure No     Comment: heavy dust, chemicals    Hobby Hazards No    Sleep Concern No    Stress Concern No    Weight Concern No    Special Diet No    Back Care Yes     Comment: all the time per pt    Exercise Yes     Comment: ocassional    Bike Helmet Yes    Seat Belt Yes    Self-Exams Yes              Review of Systems    Positive for stated Chief Complaint: Pain    Other systems are as noted in HPI.  Constitutional and vital signs reviewed.      All other systems reviewed and negative except as noted above.    Physical Exam     ED Triage Vitals   BP 07/03/24 1833 (!) 167/88   Pulse 07/03/24 1826 83   Resp 07/03/24 1826 18   Temp 07/03/24 1826 97.8 °F (36.6 °C)   Temp src 07/03/24 1826 Temporal   SpO2 07/03/24 1826 96 %   O2 Device 07/03/24 1934 None (Room air)       Current Vitals:   Vital Signs  BP: 153/74  Pulse: 68  Resp: 18  Temp: 97.8 °F (36.6 °C)  Temp src: Temporal    Oxygen Therapy  SpO2: 97 %  O2 Device: None (Room air)            Physical Exam    General appearance: This is a middle-aged male sitting on a gurney.  Vital signs were reviewed per nurses notes.  Monitor reveals a sinus rhythm rate in the 80s.  Patient is afebrile.  Initial blood pressure was 167/88.  HEENT: Normocephalic atraumatic.  Anicteric sclera.  Oral mucosa is moist.  Oropharynx is normal.  Neck: No adenopathy or thyromegaly.  Lungs are clear to auscultation.  Breath sounds are equal.  Heart exam: Normal S1-S2 without extra sounds or murmurs.   Regular rate and rhythm.  Chest: No discrete tenderness or crepitations over the right lateral chest wall.  Abdomen: NABS.  Flat, soft and nontender without masses or rebound.  Extremities are atraumatic.  Skin is dry without rashes or lesions.  Neuroexam: Awake, conversive and moving all 4 extremities well.    ED Course     Labs Reviewed   COMP METABOLIC PANEL (14) - Abnormal; Notable for the following components:       Result Value    Glucose 153 (*)     BUN 30 (*)     Calculated Osmolality 301 (*)     Alkaline Phosphatase 122 (*)     A/G Ratio 0.9 (*)     All other components within normal limits   URINALYSIS, ROUTINE - Abnormal; Notable for the following components:    Glucose Urine 100 (*)     All other components within normal limits   LIPASE - Normal   CBC WITH DIFFERENTIAL WITH PLATELET    Narrative:     The following orders were created for panel order CBC With Differential With Platelet.  Procedure                               Abnormality         Status                     ---------                               -----------         ------                     CBC W/ DIFFERENTIAL[[id number]]                              Final result                 Please view results for these tests on the individual orders.   RAINBOW DRAW LAVENDER   RAINBOW DRAW LIGHT GREEN   RAINBOW DRAW BLUE   RAINBOW DRAW GOLD   CBC W/ DIFFERENTIAL     EKG    Rate, intervals and axes as noted on EKG Report.  Rate: 71  Rhythm: Sinus Rhythm  Reading: Normal EKG.  Agree with EKG report.                 Intravenous access was obtained.  Laboratory studies were drawn.    CT ABDOMEN+PELVIS(CPT=74176)    Result Date: 7/3/2024  PROCEDURE:  CT ABDOMEN+PELVIS (CPT=74176)  COMPARISON:  [facility], CT ABDOMEN+PELVIS(CONTRAST ONLY)(CPT=74177), 5/23/2019, 11:11 AM.  INDICATIONS:  pain  TECHNIQUE:  Unenhanced multislice CT scanning was performed from the dome of the diaphragm to the pubic symphysis.  Dose reduction techniques were used. Dose  information is transmitted to the ACR (American College of Radiology) NRDR (National Radiology Data Registry) which includes the Dose Index Registry.  PATIENT STATED HISTORY: (As transcribed by Technologist)  Patient fell three weeks ago, currently has right sided abdominal pain. Patient states pain intensified after hearing a snapping sound in the area yesterday    FINDINGS:  LIVER:  No enlargement, atrophy, abnormal density, or significant focal lesion.  BILIARY:  Tiny calcified gallstone in the gallbladder. PANCREAS:  No lesion, fluid collection, ductal dilatation, or atrophy.  SPLEEN:  No enlargement or focal lesion.  KIDNEYS:  Representative right renal pelvic pelvic cyst measures 2.7 x 2.5 cm.  Additional representative left renal parapelvic cyst measures 3.5 x 2.7 cm. ADRENALS:  No mass or enlargement.  AORTA/VASCULAR:    Vascular calcifications are noted RETROPERITONEUM:  No mass or adenopathy.  BOWEL/MESENTERY:  Diverticulosis of the colon without evidence of acute diverticulitis.  The appendix is normal. ABDOMINAL WALL:  No mass or hernia.  URINARY BLADDER:  No visible focal wall thickening, lesion, or calculus.  PELVIC NODES:  No adenopathy.  PELVIC ORGANS:  No visible mass.  Pelvic organs appropriate for patient age.  BONES:  Mild dextroscoliosis of the lumbar spine. LUNG BASES:  No visible pulmonary or pleural disease.  OTHER:  Negative.             CONCLUSION:   1. Multiple parapelvic cysts in both kidneys.  Vascular calcifications in mid left kidney are noted.  No hydronephrosis.  2. Diverticulosis of the colon without evidence of acute diverticulitis.  The appendix is unremarkable.    LOCATION:  [facility]   Dictated by (CST): Amandeep Knight MD on 7/03/2024 at 8:03 PM     Finalized by (CST): Amandeep Knight MD on 7/03/2024 at 8:07 PM       I personally reviewed the images myself and went over results with patient.    I viewed the CT abdomen and pelvis without contrast films myself and there is no  evidence of acute appendicitis, diverticulitis, bowel obstruction or other intra-abdominal or retroperitoneal pathology to explain the patient's symptoms.    Test results and treatment plan were discussed.       MDM      #1.  Back strain.  Musculoskeletal etiology is most likely given the absence of any intra-abdominal or retroperitoneal pathology to explain the patient's symptoms.  He has prescriptions for an NSAID and a muscle relaxant neither of which he has started yet.  He was advised to initiate these medications.  Topical Lidoderm was applied to the back.                                   Medical Decision Making      Disposition and Plan     Clinical Impression:  1. Back strain, initial encounter         Disposition:  Discharge  7/3/2024  8:38 pm    Follow-up:  Maximo Mcduffie MD  [address] [address]  [phone]    Call in 2 day(s)            Medications Prescribed:  Discharge Medication List as of 7/3/2024  8:58 PM        START taking these medications    Details   lidocaine-menthol 4-1 % External Patch Place 1 patch onto the skin daily., Normal, Disp-30 patch, R-0

## 2024-07-04 NOTE — DISCHARGE INSTRUCTIONS
You may take a warm shower but do not sit in a hot tub or use a heating pad.  Use ice where it hurts, 20 minutes on and 20 minutes off.    Begin the prescription medications that were prescribed yesterday.    Return for new or worsening symptoms.

## 2024-07-05 LAB
ATRIAL RATE: 71 BPM
P AXIS: -17 DEGREES
P-R INTERVAL: 138 MS
Q-T INTERVAL: 380 MS
QRS DURATION: 96 MS
QTC CALCULATION (BEZET): 412 MS
R AXIS: 46 DEGREES
T AXIS: 49 DEGREES
VENTRICULAR RATE: 71 BPM

## 2024-08-21 ENCOUNTER — HOSPITAL ENCOUNTER (OUTPATIENT)
Age: 69
Discharge: HOME | End: 2024-08-21
Attending: EMERGENCY MEDICINE
Payer: MEDICARE

## 2024-08-21 VITALS
OXYGEN SATURATION: 95 % | BODY MASS INDEX: 25.2 KG/M2 | TEMPERATURE: 98 F | DIASTOLIC BLOOD PRESSURE: 79 MMHG | HEART RATE: 83 BPM | RESPIRATION RATE: 19 BRPM | SYSTOLIC BLOOD PRESSURE: 153 MMHG | WEIGHT: 180 LBS | HEIGHT: 71 IN

## 2024-08-21 DIAGNOSIS — J06.9 UPPER RESPIRATORY TRACT INFECTION, UNSPECIFIED TYPE: Primary | ICD-10-CM

## 2024-08-21 LAB — SARS-COV-2 RNA RESP QL NAA+PROBE: NOT DETECTED

## 2024-08-21 PROCEDURE — 99212 OFFICE O/P EST SF 10 MIN: CPT

## 2024-08-21 PROCEDURE — 99213 OFFICE O/P EST LOW 20 MIN: CPT

## 2024-08-21 NOTE — ED INITIAL ASSESSMENT (HPI)
Pt states since Friday, pt has had congestion, cough.   Denies chest pain or sob.    Denies sore throat.

## 2024-08-25 NOTE — ED PROVIDER NOTES
Patient Seen in: [facility]      History     Chief Complaint   Patient presents with    Cough     Stated Complaint: Persistent cough & sinus    Subjective:   HPI    69 yo male with cough and congestion since Friday. No chest pain or shortness of breath.    Objective:   Past Medical History:    Abdominal pain    Belching    Bleeding nose    Bloating    Blood in urine    Blurred vision    Calculus of kidney    Diabetes mellitus (HCC)    Disorder of prostate    Fatigue    Fever    Flatulence/gas pain/belching    Hearing loss    Heartburn    Hemorrhoids    High cholesterol    Indigestion    Kidney stones    Loss of appetite    Nausea    Night sweats    OTHER DISEASES    Diverticulosis and Diverticulitis    Painful urination    Sleep disturbance    Type II or unspecified type diabetes mellitus without mention of complication, not stated as uncontrolled    diagnosed last year march not on any medication     Uncomfortable fullness after meals    Unspecified essential hypertension    Wears glasses    Weight loss              Past Surgical History:   Procedure Laterality Date    Colonoscopy                  No pertinent social history.            Review of Systems    Positive for stated Chief Complaint: Cough    Other systems are as noted in HPI.  Constitutional and vital signs reviewed.      All other systems reviewed and negative except as noted above.    Physical Exam     ED Triage Vitals [08/21/24 1106]   /79   Pulse 83   Resp 19   Temp 98.3 °F (36.8 °C)   Temp src Temporal   SpO2 95 %   O2 Device None (Room air)       Current Vitals:   No data recorded        Physical Exam  Vitals and nursing note reviewed.   Constitutional:       Appearance: Normal appearance. He is well-developed.   HENT:      Head: Normocephalic and atraumatic.      Nose: Congestion present.      Mouth/Throat:      Mouth: Mucous membranes are moist.      Pharynx: Oropharynx is clear.   Cardiovascular:      Rate and Rhythm:  Normal rate and regular rhythm.      Heart sounds: Normal heart sounds.   Pulmonary:      Effort: Pulmonary effort is normal. No respiratory distress.      Breath sounds: Normal breath sounds.   Skin:     General: Skin is warm and dry.      Capillary Refill: Capillary refill takes less than 2 seconds.   Neurological:      General: No focal deficit present.      Mental Status: He is alert.      Sensory: No sensory deficit.   Psychiatric:         Mood and Affect: Mood normal.         Behavior: Behavior normal.              ED Course     Labs Reviewed   RAPID SARS-COV-2 BY PCR - Normal                      MDM                                      Medical Decision Making  COVID, other viral bronchitis, pneumonia all in differential. COVID negative. Likely other viral bronchitis. Over the counter dayquil/nyquil and flonase as prescribed    Disposition and Plan     Clinical Impression:  1. Upper respiratory tract infection, unspecified type         Disposition:  Discharge  8/21/2024 11:45 am    Follow-up:  Maximo Mcduffie MD  [address] [address]  [phone]      As needed          Medications Prescribed:  Discharge Medication List as of 8/21/2024 11:46 AM

## (undated) NOTE — ED AVS SNAPSHOT
Analisa Blanco   MRN: [id number]    Department:  [facility] Emergency Department   Date of Visit:  11/17/2018           Disclosure     Insurance plans vary and the physician(s) referred by the ER may not be covered by your plan.  Please contact yo tell this physician (or your personal doctor if your instructions are to return to your personal doctor) about any new or lasting problems. The primary care or specialist physician will see patients referred from the [facility] Emergency Department.  Girma Gillette

## (undated) NOTE — ED AVS SNAPSHOT
Lynnette Has   MRN: [id number]    Department:  [facility] Emergency Department   Date of Visit:  7/9/2019           Disclosure     Insurance plans vary and the physician(s) referred by the ER may not be covered by your plan.  Please contact your tell this physician (or your personal doctor if your instructions are to return to your personal doctor) about any new or lasting problems. The primary care or specialist physician will see patients referred from the [facility] Emergency Department.  Anupama Mullins

## (undated) NOTE — ED AVS SNAPSHOT
Boo Chamberlain   MRN: [id number]    Department:  [facility] Emergency Department   Date of Visit:  12/30/2017           Disclosure     Insurance plans vary and the physician(s) referred by the ER may not be covered by your plan.  Please contact yo tell this physician (or your personal doctor if your instructions are to return to your personal doctor) about any new or lasting problems. The primary care or specialist physician will see patients referred from the [facility] Emergency Department.  Chai Lund

## (undated) NOTE — LETTER
Date & Time: 8/4/2022, 11:56 PM  Patient: Katarina Conrad  Encounter Provider(s):    Douglas Mcgill MD         This certifies that I, Katarina Conrad, a patient at an [facility] facility, am leaving the facility voluntarily and against the advice of my physician. I acknowledge that I have been:    1. informed that my physician believes that I need to receive care here;  2. informed that if I leave, I could become sicker or even die; and  3. provided discharge instructions consistent with my current diagnosis. I hereby release my physician, the facility, and its employees from all responsibility for any ill effects which may result from this action. __________________________________  Patient or authorized caregiver signature    __________________________________  RN signature    If no patient or patient representative signature was obtained, sign below to acknowledge that the form was reviewed with the patient and that the patient refused to sign.     __________________________________  RN signature

## (undated) NOTE — ED AVS SNAPSHOT
Linwood Vaughan   MRN: [id number]    Department:  [facility] Emergency Department   Date of Visit:  3/4/2018           Disclosure     Insurance plans vary and the physician(s) referred by the ER may not be covered by your plan.  Please contact your tell this physician (or your personal doctor if your instructions are to return to your personal doctor) about any new or lasting problems. The primary care or specialist physician will see patients referred from the [facility] Emergency Department.  Maria Dolores Peña

## (undated) NOTE — ED AVS SNAPSHOT
Tessy Miranda   MRN: [id number]    Department:  [facility] Emergency Department   Date of Visit:  8/12/2017           Disclosure     Insurance plans vary and the physician(s) referred by the ER may not be covered by your plan.  Please contact you If you have been prescribed any medication(s), please fill your prescription right away and begin taking the medication(s) as directed    If the emergency physician has read X-rays, these will be re-interpreted by a radiologist.  If there is a significant

## (undated) NOTE — ED AVS SNAPSHOT
Lynnette Has   MRN: [id number]    Department:  [facility] Emergency Department   Date of Visit:  11/26/2018           Disclosure     Insurance plans vary and the physician(s) referred by the ER may not be covered by your plan.  Please contact yo tell this physician (or your personal doctor if your instructions are to return to your personal doctor) about any new or lasting problems. The primary care or specialist physician will see patients referred from the [facility] Emergency Department.  Walker Ray